# Patient Record
Sex: MALE | Race: WHITE | URBAN - METROPOLITAN AREA
[De-identification: names, ages, dates, MRNs, and addresses within clinical notes are randomized per-mention and may not be internally consistent; named-entity substitution may affect disease eponyms.]

---

## 2017-01-09 ENCOUNTER — OFFICE VISIT (OUTPATIENT)
Dept: BEHAVIORAL/MENTAL HEALTH CLINIC | Age: 47
End: 2017-01-09

## 2017-01-09 VITALS
BODY MASS INDEX: 27.94 KG/M2 | WEIGHT: 178 LBS | SYSTOLIC BLOOD PRESSURE: 126 MMHG | HEART RATE: 88 BPM | DIASTOLIC BLOOD PRESSURE: 84 MMHG | HEIGHT: 67 IN

## 2017-01-09 DIAGNOSIS — F42.9 OCD (OBSESSIVE COMPULSIVE DISORDER): ICD-10-CM

## 2017-01-09 DIAGNOSIS — F41.1 GENERALIZED ANXIETY DISORDER: ICD-10-CM

## 2017-01-09 DIAGNOSIS — F40.10 SOCIAL ANXIETY DISORDER: ICD-10-CM

## 2017-01-09 RX ORDER — CITALOPRAM 40 MG/1
40 TABLET, FILM COATED ORAL DAILY
Qty: 30 TAB | Refills: 2 | Status: SHIPPED | OUTPATIENT
Start: 2017-01-09 | End: 2017-03-08 | Stop reason: SDUPTHER

## 2017-01-09 RX ORDER — ZOLPIDEM TARTRATE 10 MG/1
10 TABLET ORAL
Qty: 30 TAB | Refills: 2 | Status: SHIPPED | OUTPATIENT
Start: 2017-01-09 | End: 2017-03-28 | Stop reason: SDUPTHER

## 2017-01-09 RX ORDER — CLONAZEPAM 0.5 MG/1
0.5 TABLET ORAL 3 TIMES DAILY
Qty: 90 TAB | Refills: 2 | Status: SHIPPED | OUTPATIENT
Start: 2017-01-09 | End: 2017-03-28 | Stop reason: SDUPTHER

## 2017-01-09 RX ORDER — CITALOPRAM 20 MG/1
TABLET, FILM COATED ORAL
Qty: 30 TAB | Refills: 2 | Status: SHIPPED | OUTPATIENT
Start: 2017-01-09 | End: 2017-03-28 | Stop reason: SDUPTHER

## 2017-01-09 NOTE — PROGRESS NOTES
Psychiatric Progress Note    Date: 1/9/2017  Account Number:  [id number]  Name: Zafar Frausto    Length of psychotherapy session: 20 minutes     Total Patient Care Time Spent: 30 minutes: (Coordinated care:  counseling time with patient, individual psychotherapy with patient; discussions with family members and chart review). CC: Anxiety  SUBJECTIVE:   Zafar Frausto  is a 40 y.o.  male  patient presents for a therapy/psychopharmacological management appointment. He presents today reporting that he enjoyed the holidays with his wife. He is enjoying his new job working at the [employer].         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, but (+)hearing aid and some dizziness  Very minimal alcohol intake since he     Appetite: good  Sleep: stable    Response to Treatment: improved  Side Effects:       Supportive/Cognitive/Reality-Oriented psychotherapy provided in regards to psychosocial stressors:   Current problems   Housing issues   Occupational issues- looking for work   Academic issues   Legal issues   Medical issues   Interpersonal conflicts-   Psychoeducation provided  Treatment plan reviewed with patient-including diagnosis and medications  Worked on issues of denial & effects of substance dependency/use    Visit Vitals    /84 (BP 1 Location: Left arm)    Pulse 88    Ht 5' 7\" (1.702 m)    Wt 80.7 kg (178 lb)    BMI 27.88 kg/m2       OBJECTIVE:                 Mental Status exam: WNL except for      Sensorium  oriented to time, place and person   Relations cooperative    Eye Contact    appropriate   Appearance:  age appropriate and casually dressed   Motor Behavior:  within normal limits   Speech:  normal pitch and normal volume   Thought Process: within normal limits   Thought Content not internally preoccupied    Suicidal ideations none   Homicidal ideations none   Mood:    anxious   Affect:    anxious   Memory recent  adequate   Memory remote:  adequate Concentration:  adequate   Abstraction:  abstract   Insight:  good   Reliability good   Judgment:  good     . vitals  No Known Allergies     Current Outpatient Prescriptions   Medication Sig Dispense Refill    citalopram (CELEXA) 20 mg tablet TAKE 1 TABLET BY MOUTH DAILY IN ADDITION TO 40MG TABLET FOR A TOTAL OF 60MG DAILY 30 Tab 0    citalopram (CELEXA) 20 mg tablet TAKE 1 TABLET BY MOUTH DAILY IN ADDITION TO 40MG TABLET FOR A TOTAL OF 60MG DAILY 30 Tab 1    citalopram (CELEXA) 40 mg tablet Take 1 Tab by mouth daily. In addition to 20mg for a total of 60mg daily. 30 Tab 2    clonazePAM (KLONOPIN) 0.5 mg tablet Take 1 Tab by mouth three (3) times daily. 90 Tab 2    zolpidem (AMBIEN) 10 mg tablet Take 1 Tab by mouth nightly. Max Daily Amount: 10 mg. 30 Tab 2    citalopram (CELEXA) 20 mg tablet TAKE 1 TABLET BY MOUTH DAILY IN ADDITION TO 40MG TABLET FOR A TOTAL OF 60MG DAILY 30 Tab 2    hyoscyamine SR (LEVBID) 0.375 mg SR tablet TAKE ONE TABLET BY MOUTH EVERY 12 HOURS AS NEEDED FOR CRAMPING AND DIARRHEA  6    LOPERAMIDE HCL (IMODIUM PO) Take  by mouth as needed.  psyllium (METAMUCIL) 0.52 gram capsule Take 1 Cap by mouth daily.  Cholecalciferol, Vitamin D3, 3,000 unit tab Take 1 Tab by mouth daily.  citalopram (CELEXA) 20 mg tablet Take 1 Tab by mouth daily. In addition to 40mg tablet for a total of 60mg  Indications: MAJOR DEPRESSIVE DISORDER 30 Tab 2    meclizine (ANTIVERT) 25 mg tablet Take 25 mg by mouth daily. Medication Changes/Adjustments: There are no discontinued medications. ASSESSMENT:  Enedelia Angelo  is a 40 y.o.  male patient presents for ongoing management of his anxiety symptoms, much improved on current medication regimen.      The following regarding medications was addressed during rounds:   -The risks and benefits of the proposed medication   -The potential medication side effects dry mouth, weight gain, GI upset, headache  -Patient given opportunity to ask questions    Diagnoses:   Axis I: Generalized Anxiety disorder; social anxiety disorder; major depressive disorder- in remission  Axis II: Deferred  Axis III: none  Axis IV: none  Axis V:61-70 mild symptoms    RECOMMENDATIONS/PLAN:   1. Medications:   Continue current regimen: Klonopin and Celexa  Continue Celexa  60mg daily- anxiety  Klonopin 0.5mg three times daily. Warned about possible sedation while driving, avoid driving if tired. Continue Ambien 10mg at bedtime   No orders of the defined types were placed in this encounter. 2.  Follow-up Disposition: Not on File     3. Continue to encourage abstinence from alcohol    4.  Provided supportive psychotherapy

## 2017-01-09 NOTE — MR AVS SNAPSHOT
Visit Information Date & Time Provider Department Dept. Phone Encounter #  
 1/9/2017  2:00 PM Sylvain Paulino MD [id number] [facility] [phone] [id number] Upcoming Health Maintenance Date Due DTaP/Tdap/Td series (1 - Tdap) 10/9/1992 INFLUENZA AGE 9 TO ADULT 8/1/2016 Allergies as of 1/9/2017  Review Complete On: 1/9/2017 By: Poornima Magaña No Known Allergies Current Immunizations  Never Reviewed No immunizations on file. Not reviewed this visit You Were Diagnosed With   
  
 Codes Comments Generalized anxiety disorder     ICD-10-CM: F41.1 ICD-9-CM: 300.02   
 OCD (obsessive compulsive disorder)     ICD-10-CM: F42.9 ICD-9-CM: 300.3 Social anxiety disorder     ICD-10-CM: F40.10 ICD-9-CM: 300.23 Vitals BP Pulse Height(growth percentile) Weight(growth percentile) BMI Smoking Status 126/84 (BP 1 Location: Left arm) 88 5' 7\" (1.702 m) 178 lb (80.7 kg) 27.88 kg/m2 Never Smoker Vitals History BMI and BSA Data Body Mass Index Body Surface Area  
 27.88 kg/m 2 1.95 m 2 Preferred Pharmacy Pharmacy [facility] Phone [facility] [address] [place] [address] [phone] Your Updated Medication List  
  
   
This list is accurate as of: 1/9/17  2:15 PM.  Always use your most recent med list.  
  
  
  
  
 Cholecalciferol (Vitamin D3) 3,000 unit Tab Take 1 Tab by mouth daily. * citalopram 20 mg tablet Commonly known as:  Tempie Tino Take 1 Tab by mouth daily. In addition to 40mg tablet for a total of 60mg  Indications: MAJOR DEPRESSIVE DISORDER  
  
 * citalopram 20 mg tablet Commonly known as:  CELEXA  
TAKE 1 TABLET BY MOUTH DAILY IN ADDITION TO 40MG TABLET FOR A TOTAL OF 60MG DAILY * citalopram 20 mg tablet Commonly known as:  CELEXA  
TAKE 1 TABLET BY MOUTH DAILY IN ADDITION TO 40MG TABLET FOR A TOTAL OF 60MG DAILY * citalopram 20 mg tablet Commonly known as:  CELEXA  
TAKE 1 TABLET BY MOUTH DAILY IN ADDITION TO 40MG TABLET FOR A TOTAL OF 60MG DAILY * citalopram 40 mg tablet Commonly known as:  Morganbernie Pinaau Take 1 Tab by mouth daily. In addition to 20mg for a total of 60mg daily. clonazePAM 0.5 mg tablet Commonly known as:  Jannet Salgado Take 1 Tab by mouth three (3) times daily. hyoscyamine SR 0.375 mg SR tablet Commonly known as:  LEVBID  
TAKE ONE TABLET BY MOUTH EVERY 12 HOURS AS NEEDED FOR CRAMPING AND DIARRHEA IMODIUM PO Take  by mouth as needed. meclizine 25 mg tablet Commonly known as:  ANTIVERT Take 25 mg by mouth daily. METAMUCIL 0.52 gram capsule Generic drug:  psyllium Take 1 Cap by mouth daily. zolpidem 10 mg tablet Commonly known as:  AMBIEN Take 1 Tab by mouth nightly. Max Daily Amount: 10 mg.  
  
 * Notice: This list has 5 medication(s) that are the same as other medications prescribed for you. Read the directions carefully, and ask your doctor or other care provider to review them with you. Prescriptions Printed Refills  
 clonazePAM (KLONOPIN) 0.5 mg tablet 2 Sig: Take 1 Tab by mouth three (3) times daily. Class: Print Route: Oral  
 zolpidem (AMBIEN) 10 mg tablet 2 Sig: Take 1 Tab by mouth nightly. Max Daily Amount: 10 mg.  
 Class: Print Route: Oral  
  
Prescriptions Sent to Pharmacy Refills  
 citalopram (CELEXA) 20 mg tablet 2 Sig: TAKE 1 TABLET BY MOUTH DAILY IN ADDITION TO 40MG TABLET FOR A TOTAL OF 60MG DAILY Class: Normal  
 Pharmacy: [facility] [address] [facility] Ph #: [phone]  
 citalopram (CELEXA) 40 mg tablet 2 Sig: Take 1 Tab by mouth daily. In addition to 20mg for a total of 60mg daily. Class: Normal  
 Pharmacy: [facility] [address] [place] [address], [address] [facility] Ph #: [phone] Route: Oral  
  
Introducing hospitals & [facility] SERVICES! Ghada Sanders introduces [employer] patient portal. Now you can access parts of your medical record, email your doctor's office, and request medication refills online. 1. In your internet browser, go to [email or web address] 2. Click on the First Time User? Click Here link in the Sign In box. You will see the New Member Sign Up page. 3. Enter your [employer] Access Code exactly as it appears below. You will not need to use this code after youve completed the sign-up process. If you do not sign up before the expiration date, you must request a new code. · [employer] Access Code: [facility] Expires: 4/9/2017  2:15 PM 
 
4. Enter the last four digits of your Social Security Number (xxxx) and Date of Birth (mm/dd/yyyy) as indicated and click Submit. You will be taken to the next sign-up page. 5. Create a [employer]jeek ID. This will be your Mojeek login ID and cannot be changed, so think of one that is secure and easy to remember. 6. Create a [employer] password. You can change your password at any time. 7. Enter your Password Reset Question and Answer. This can be used at a later time if you forget your password. 8. Enter your e-mail address. You will receive e-mail notification when new information is available in [address] 9. Click Sign Up. You can now view and download portions of your medical record. 10. Click the Download Summary menu link to download a portable copy of your medical information. If you have questions, please visit the Frequently Asked Questions section of the [employer] website. Remember, [employer]ek is NOT to be used for urgent needs. For medical emergencies, dial 911. Now available from your iPhone and Android! Please provide this summary of care documentation to your next provider. Your primary care clinician is listed as Lynne Gu If you have any questions after today's visit, please call [phone].

## 2017-03-28 ENCOUNTER — OFFICE VISIT (OUTPATIENT)
Dept: BEHAVIORAL/MENTAL HEALTH CLINIC | Age: 47
End: 2017-03-28

## 2017-03-28 VITALS
BODY MASS INDEX: 28.41 KG/M2 | HEART RATE: 103 BPM | HEIGHT: 67 IN | SYSTOLIC BLOOD PRESSURE: 124 MMHG | DIASTOLIC BLOOD PRESSURE: 79 MMHG | WEIGHT: 181 LBS

## 2017-03-28 DIAGNOSIS — F42.9 OCD (OBSESSIVE COMPULSIVE DISORDER): ICD-10-CM

## 2017-03-28 DIAGNOSIS — F41.1 GENERALIZED ANXIETY DISORDER: Primary | ICD-10-CM

## 2017-03-28 DIAGNOSIS — F33.40 MAJOR DEPRESSIVE DISORDER, RECURRENT, IN REMISSION (HCC): ICD-10-CM

## 2017-03-28 DIAGNOSIS — F40.10 SOCIAL ANXIETY DISORDER: ICD-10-CM

## 2017-03-28 DIAGNOSIS — F33.40 RECURRENT MAJOR DEPRESSIVE DISORDER, IN REMISSION (HCC): ICD-10-CM

## 2017-03-28 RX ORDER — CITALOPRAM 20 MG/1
20 TABLET, FILM COATED ORAL DAILY
Qty: 30 TAB | Refills: 3 | Status: SHIPPED | OUTPATIENT
Start: 2017-03-28 | End: 2017-07-10 | Stop reason: SDUPTHER

## 2017-03-28 RX ORDER — CLONAZEPAM 0.5 MG/1
TABLET ORAL
Qty: 90 TAB | Refills: 3 | Status: SHIPPED | OUTPATIENT
Start: 2017-03-28 | End: 2017-07-10 | Stop reason: SDUPTHER

## 2017-03-28 RX ORDER — AMITRIPTYLINE HYDROCHLORIDE 10 MG/1
TABLET, FILM COATED ORAL
COMMUNITY
Start: 2017-03-08

## 2017-03-28 RX ORDER — CITALOPRAM 40 MG/1
TABLET, FILM COATED ORAL
Qty: 30 TAB | Refills: 3 | Status: SHIPPED | OUTPATIENT
Start: 2017-03-28 | End: 2017-07-10 | Stop reason: SDUPTHER

## 2017-03-28 RX ORDER — ZOLPIDEM TARTRATE 10 MG/1
10 TABLET ORAL
Qty: 30 TAB | Refills: 3 | Status: SHIPPED | OUTPATIENT
Start: 2017-03-28 | End: 2017-07-10 | Stop reason: SDUPTHER

## 2017-03-28 NOTE — PROGRESS NOTES
Psychiatric Progress Note    Date: 3/28/2017  Account Number:  [id number]  Name: Heena Farias    Length of psychotherapy session: 20 minutes     Total Patient Care Time Spent: 30 minutes: (Coordinated care:  counseling time with patient, individual psychotherapy with patient; discussions with family members and chart review). CC: Anxiety  SUBJECTIVE:   Heena Farias  is a 40 y.o.  male  patient presents for a therapy/psychopharmacological management appointment. He presents today reporting that he is enjoying his new job and new marriage. No significant problems with depression, anxiety, insomnia, avolition, etc. He has been able to limit his alcohol intake, but his wife is worried. Tolerating medications well. He is wearing hearing aids which he finds very helpful.          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[employer], editing legislation   Academic issues   Legal issues   Medical issues   Interpersonal conflicts- continue to adjust to  life  Psychoeducation provided  Treatment plan reviewed with patient-including diagnosis and medications  Worked on issues of denial & effects of substance dependency/use    Visit Vitals    /79    Pulse (!) 103    Ht 5' 7\" (1.702 m)    Wt 82.1 kg (181 lb)    BMI 28.35 kg/m2       OBJECTIVE:                 Mental Status exam: WNL except for      Sensorium  oriented to time, place and person   Relations cooperative    Eye Contact    appropriate   Appearance:  age appropriate and casually dressed   Motor Behavior:  within normal limits   Speech:  normal pitch and normal volume   Thought Process: within normal limits   Thought Content not internally preoccupied    Suicidal ideations none   Homicidal ideations none   Mood: anxious   Affect:    anxious   Memory recent  adequate   Memory remote:  adequate   Concentration:  adequate   Abstraction:  abstract   Insight:  good   Reliability good   Judgment:  good     . vitals  No Known Allergies     Current Outpatient Prescriptions   Medication Sig Dispense Refill    amitriptyline (ELAVIL) 10 mg tablet       citalopram (CELEXA) 40 mg tablet TAKE ONE TABLET BY MOUTH DAILY IN ADDITION TO 20MG TABLET FOR A TOTAL OF 60MG DAILY 30 Tab 0    clonazePAM (KLONOPIN) 0.5 mg tablet Take 1 Tab by mouth three (3) times daily. 90 Tab 2    zolpidem (AMBIEN) 10 mg tablet Take 1 Tab by mouth nightly. Max Daily Amount: 10 mg. 30 Tab 2    hyoscyamine SR (LEVBID) 0.375 mg SR tablet TAKE ONE TABLET BY MOUTH EVERY 12 HOURS AS NEEDED FOR CRAMPING AND DIARRHEA  6    LOPERAMIDE HCL (IMODIUM PO) Take  by mouth as needed.  psyllium (METAMUCIL) 0.52 gram capsule Take 1 Cap by mouth daily.  Cholecalciferol, Vitamin D3, 3,000 unit tab Take 1 Tab by mouth daily.  citalopram (CELEXA) 20 mg tablet Take 1 Tab by mouth daily. In addition to 40mg tablet for a total of 60mg  Indications: MAJOR DEPRESSIVE DISORDER 30 Tab 2    meclizine (ANTIVERT) 25 mg tablet Take 25 mg by mouth daily. Medication Changes/Adjustments:   Medications Discontinued During This Encounter   Medication Reason    citalopram (CELEXA) 20 mg tablet Duplicate Order    citalopram (CELEXA) 20 mg tablet Duplicate Order    citalopram (CELEXA) 20 mg tablet Duplicate Order          ASSESSMENT:  Eamon West  is a 40 y.o.  male patient presents for ongoing management of his anxiety symptoms, much improved on current medication regimen.      The following regarding medications was addressed during rounds:   -The risks and benefits of the proposed medication   -The potential medication side effects dry mouth, weight gain, GI upset, headache  -Patient given opportunity to ask questions    Diagnoses:   Axis I: Generalized Anxiety disorder; social anxiety disorder; major depressive disorder- in remission  Axis II: Deferred  Axis III: none  Axis IV: none  Axis V:61-70 mild symptoms    RECOMMENDATIONS/PLAN:   1. Medications:   Continue current regimen: Klonopin and Celexa  Continue Celexa  60mg daily- anxiety  Klonopin 0.5mg three times daily. Warned about possible sedation while driving, avoid driving if tired. Continue Ambien 10mg at bedtime   Orders Placed This Encounter    amitriptyline (ELAVIL) 10 mg tablet      2. Follow-up Disposition: Not on File     3. Continue to encourage abstinence from alcohol    4.  Provided supportive psychotherapy

## 2017-03-28 NOTE — MR AVS SNAPSHOT
Visit Information Date & Time Provider Department Dept. Phone Encounter #  
 3/28/2017  2:00 PM Shobha Skaggs MD [address] [facility] [phone] [id number] Upcoming Health Maintenance Date Due DTaP/Tdap/Td series (1 - Tdap) 10/9/1992 INFLUENZA AGE 9 TO ADULT 8/1/2016 Allergies as of 3/28/2017  Review Complete On: 3/28/2017 By: Shobha Skaggs MD  
 No Known Allergies Current Immunizations  Never Reviewed No immunizations on file. Not reviewed this visit You Were Diagnosed With   
  
 Codes Comments Generalized anxiety disorder    -  Primary ICD-10-CM: F41.1 ICD-9-CM: 300.02 Recurrent major depressive disorder, in remission [facility])     ICD-10-CM: F33.40 ICD-9-CM: 296.35   
 OCD (obsessive compulsive disorder)     ICD-10-CM: F42.9 ICD-9-CM: 300.3 Social anxiety disorder     ICD-10-CM: F40.10 ICD-9-CM: 300.23 Major depressive disorder, recurrent, in remission ([facility]     ICD-10-CM: F33.40 ICD-9-CM: 296.35 Vitals BP Pulse Height(growth percentile) Weight(growth percentile) BMI Smoking Status 124/79 (!) 103 5' 7\" (1.702 m) 181 lb (82.1 kg) 28.35 kg/m2 Never Smoker Vitals History BMI and BSA Data Body Mass Index Body Surface Area  
 28.35 kg/m 2 1.97 m 2 Preferred Pharmacy Pharmacy Name Phone [facility] [address] [phone] Your Updated Medication List  
  
   
This list is accurate as of: 3/28/17  2:36 PM.  Always use your most recent med list.  
  
  
  
  
 amitriptyline 10 mg tablet Commonly known as:  ELAVIL Cholecalciferol (Vitamin D3) 3,000 unit Tab Take 1 Tab by mouth daily. * citalopram 40 mg tablet Commonly known as:  CELEXA  
TAKE ONE TABLET BY MOUTH DAILY IN ADDITION TO 20MG TABLET FOR A TOTAL OF 60MG DAILY * citalopram 20 mg tablet Commonly known as:  Mertha Slim Take 1 Tab by mouth daily. In addition to 40mg tablet for a total of 60mg  Indications: major depressive disorder  
  
 clonazePAM 0.5 mg tablet Commonly known as:  Ardie Jaden Take one tablet oral in the morning, half a tablet in the afternoon and one tablet  
  
 hyoscyamine SR 0.375 mg SR tablet Commonly known as:  LEVBID  
TAKE ONE TABLET BY MOUTH EVERY 12 HOURS AS NEEDED FOR CRAMPING AND DIARRHEA IMODIUM PO Take  by mouth as needed. meclizine 25 mg tablet Commonly known as:  ANTIVERT Take 25 mg by mouth daily. METAMUCIL 0.52 gram capsule Generic drug:  psyllium Take 1 Cap by mouth daily. zolpidem 10 mg tablet Commonly known as:  AMBIEN Take 1 Tab by mouth nightly. Max Daily Amount: 10 mg.  
  
 * Notice: This list has 2 medication(s) that are the same as other medications prescribed for you. Read the directions carefully, and ask your doctor or other care provider to review them with you. Prescriptions Printed Refills  
 zolpidem (AMBIEN) 10 mg tablet 3 Sig: Take 1 Tab by mouth nightly. Max Daily Amount: 10 mg.  
 Class: Print Route: Oral  
 clonazePAM (KLONOPIN) 0.5 mg tablet 3 Sig: Take one tablet oral in the morning, half a tablet in the afternoon and one tablet Class: Print Prescriptions Sent to Pharmacy Refills  
 citalopram (CELEXA) 40 mg tablet 3 Sig: TAKE ONE TABLET BY MOUTH DAILY IN ADDITION TO 20MG TABLET FOR A TOTAL OF 60MG DAILY Class: Normal  
 Pharmacy: [address] Ph #: [phone]  
 citalopram (CELEXA) 20 mg tablet 3 Sig: Take 1 Tab by mouth daily. In addition to 40mg tablet for a total of 60mg  Indications: major depressive disorder Class: Normal  
 Pharmacy: [address] Ph #: [phone] Route: Oral  
  
Introducing [facility] & HEALTH SERVICES!    
 Mimi Leiva introduces [employer] patient portal. Now you can access parts of your medical record, email your doctor's office, and request medication refills online. 1. In your internet browser, go to [email or web address] 2. Click on the First Time User? Click Here link in the Sign In box. You will see the New Member Sign Up page. 3. Enter your [employer] Access Code exactly as it appears below. You will not need to use this code after youve completed the sign-up process. If you do not sign up before the expiration date, you must request a new code. · [employer] Access Code: [facility] Expires: 4/9/2017  3:15 PM 
 
4. Enter the last four digits of your Social Security Number (xxxx) and Date of Birth (mm/dd/yyyy) as indicated and click Submit. You will be taken to the next sign-up page. 5. Create a [employer] ID. This will be your [employer] login ID and cannot be changed, so think of one that is secure and easy to remember. 6. Create a [employer] password. You can change your password at any time. 7. Enter your Password Reset Question and Answer. This can be used at a later time if you forget your password. 8. Enter your e-mail address. You will receive e-mail notification when new information is available in [address] 9. Click Sign Up. You can now view and download portions of your medical record. 10. Click the Download Summary menu link to download a portable copy of your medical information. If you have questions, please visit the Frequently Asked Questions section of the [employer] website. Remember, [employer] is NOT to be used for urgent needs. For medical emergencies, dial 911. Now available from your iPhone and Android! Please provide this summary of care documentation to your next provider. Your primary care clinician is listed as Adin Douglas If you have any questions after today's visit, please call [phone].

## 2017-07-10 ENCOUNTER — OFFICE VISIT (OUTPATIENT)
Dept: BEHAVIORAL/MENTAL HEALTH CLINIC | Age: 47
End: 2017-07-10

## 2017-07-10 VITALS
DIASTOLIC BLOOD PRESSURE: 78 MMHG | SYSTOLIC BLOOD PRESSURE: 128 MMHG | BODY MASS INDEX: 28.88 KG/M2 | WEIGHT: 184 LBS | HEIGHT: 67 IN | HEART RATE: 107 BPM

## 2017-07-10 DIAGNOSIS — F41.1 GENERALIZED ANXIETY DISORDER: ICD-10-CM

## 2017-07-10 DIAGNOSIS — F33.40 MAJOR DEPRESSIVE DISORDER, RECURRENT, IN REMISSION (HCC): ICD-10-CM

## 2017-07-10 DIAGNOSIS — F40.10 SOCIAL ANXIETY DISORDER: ICD-10-CM

## 2017-07-10 RX ORDER — ZOLPIDEM TARTRATE 10 MG/1
10 TABLET ORAL
Qty: 30 TAB | Refills: 3 | Status: SHIPPED | OUTPATIENT
Start: 2017-07-10 | End: 2017-08-16 | Stop reason: SDUPTHER

## 2017-07-10 RX ORDER — CLONAZEPAM 0.5 MG/1
TABLET ORAL
Qty: 90 TAB | Refills: 3 | Status: SHIPPED | OUTPATIENT
Start: 2017-07-10 | End: 2017-08-16 | Stop reason: SDUPTHER

## 2017-07-10 RX ORDER — HYDROCORTISONE ACETATE PRAMOXINE HCL 2.5; 1 G/100G; G/100G
CREAM TOPICAL
COMMUNITY
Start: 2017-07-06

## 2017-07-10 RX ORDER — CITALOPRAM 20 MG/1
20 TABLET, FILM COATED ORAL DAILY
Qty: 30 TAB | Refills: 3 | Status: SHIPPED | OUTPATIENT
Start: 2017-07-10 | End: 2017-08-16 | Stop reason: SDUPTHER

## 2017-07-10 RX ORDER — CITALOPRAM 40 MG/1
TABLET, FILM COATED ORAL
Qty: 30 TAB | Refills: 3 | Status: SHIPPED | OUTPATIENT
Start: 2017-07-10 | End: 2017-08-16 | Stop reason: SDUPTHER

## 2017-07-10 RX ORDER — HYDROCHLOROTHIAZIDE 12.5 MG/1
CAPSULE ORAL
COMMUNITY

## 2017-07-10 NOTE — MR AVS SNAPSHOT
Visit Information Date & Time Provider Department Dept. Phone Encounter #  
 7/10/2017  3:30 PM Rene Deleon MD [address] [phone] [id number] Upcoming Health Maintenance Date Due DTaP/Tdap/Td series (1 - Tdap) 10/9/1992 INFLUENZA AGE 9 TO ADULT 8/1/2017 Allergies as of 7/10/2017  Review Complete On: 7/10/2017 By: Jazmín Melton No Known Allergies Current Immunizations  Never Reviewed No immunizations on file. Not reviewed this visit You Were Diagnosed With   
  
 Codes Comments Generalized anxiety disorder     ICD-10-CM: F41.1 ICD-9-CM: 300.02 Social anxiety disorder     ICD-10-CM: F40.10 ICD-9-CM: 300.23 Major depressive disorder, recurrent, in remission ([facility]     ICD-10-CM: F33.40 ICD-9-CM: 296.35 Vitals BP Pulse Height(growth percentile) Weight(growth percentile) BMI Smoking Status 128/78 (!) 107 5' 7\" (1.702 m) 184 lb (83.5 kg) 28.82 kg/m2 Never Smoker Vitals History BMI and BSA Data Body Mass Index Body Surface Area  
 28.82 kg/m 2 1.99 m 2 [facility] [address] [phone] Your Updated Medication List  
  
   
This list is accurate as of: 7/10/17  3:53 PM.  Always use your most recent med list.  
  
  
  
  
 amitriptyline 10 mg tablet Commonly known as:  ELAVIL Cholecalciferol (Vitamin D3) 3,000 unit Tab Take 1 Tab by mouth daily. * citalopram 20 mg tablet Commonly known as:  Gray Deed Take 1 Tab by mouth daily. In addition to 40mg tablet for a total of 60mg  Indications: major depressive disorder * citalopram 40 mg tablet Commonly known as:  CELEXA  
TAKE ONE TABLET BY MOUTH DAILY IN ADDITION TO 20MG TABLET FOR A TOTAL OF 60MG DAILY  
  
 clonazePAM 0.5 mg tablet Commonly known as:  Yany Moctezuma  
 Take one tablet oral in the morning, half a tablet in the afternoon and one tablet  
  
 hydroCHLOROthiazide 12.5 mg capsule Commonly known as:  Don Brand Take  by mouth. hydrocortisone-pramoxine 2.5-1 % rectal cream  
Commonly known as:  ANALPRAM HC 2.5%-1%  
  
 hyoscyamine SR 0.375 mg SR tablet Commonly known as:  LEVBID  
TAKE ONE TABLET BY MOUTH EVERY 12 HOURS AS NEEDED FOR CRAMPING AND DIARRHEA IMODIUM PO Take  by mouth as needed. meclizine 25 mg tablet Commonly known as:  ANTIVERT Take 25 mg by mouth daily. METAMUCIL 0.52 gram capsule Generic drug:  psyllium Take 1 Cap by mouth daily. zolpidem 10 mg tablet Commonly known as:  AMBIEN Take 1 Tab by mouth nightly. Max Daily Amount: 10 mg.  
  
 * Notice: This list has 2 medication(s) that are the same as other medications prescribed for you. Read the directions carefully, and ask your doctor or other care provider to review them with you. Prescriptions Printed Refills  
 clonazePAM (KLONOPIN) 0.5 mg tablet 3 Sig: Take one tablet oral in the morning, half a tablet in the afternoon and one tablet Class: Print  
 citalopram (CELEXA) 20 mg tablet 3 Sig: Take 1 Tab by mouth daily. In addition to 40mg tablet for a total of 60mg  Indications: major depressive disorder Class: Print Route: Oral  
 citalopram (CELEXA) 40 mg tablet 3 Sig: TAKE ONE TABLET BY MOUTH DAILY IN ADDITION TO 20MG TABLET FOR A TOTAL OF 60MG DAILY Class: Print  
 zolpidem (AMBIEN) 10 mg tablet 3 Sig: Take 1 Tab by mouth nightly. Max Daily Amount: 10 mg.  
 Class: Print Route: Oral  
  
Introducing [facility]! Mariaa Zuleta introduces [employer] patient portal. Now you can access parts of your medical record, email your doctor's office, and request medication refills online. 1. In your internet browser, go to [email or web address] 2. Click on the First Time User? Click Here link in the Sign In box. You will see the New Member Sign Up page. 3. Enter your Ac[employer] Access Code exactly as it appears below. You will not need to use this code after youve completed the sign-up process. If you do not sign up before the expiration date, you must request a new code. · Ac[employer] Access Code: [email or web address] Expires: 10/8/2017  3:53 PM 
 
4. Enter the last four digits of your Social Security Number (xxxx) and Date of Birth (mm/dd/yyyy) as indicated and click Submit. You will be taken to the next sign-up page. 5. Create a Acceleforce ID. This will be your Ac[employer] login ID and cannot be changed, so think of one that is secure and easy to remember. 6. Create a Ac[employer] password. You can change your password at any time. 7. Enter your Password Reset Question and Answer. This can be used at a later time if you forget your password. 8. Enter your e-mail address. You will receive e-mail notification when new information is available in [address] 9. Click Sign Up. You can now view and download portions of your medical record. 10. Click the Download Summary menu link to download a portable copy of your medical information. If you have questions, please visit the Frequently Asked Questions section of the Ac[employer] website. Remember, [employer] is NOT to be used for urgent needs. For medical emergencies, dial 911. Now available from your iPhone and Android! Please provide this summary of care documentation to your next provider. Your primary care clinician is listed as Kathryn Brooks If you have any questions after today's visit, please call [phone].

## 2017-07-10 NOTE — PROGRESS NOTES
Psychiatric Progress Note    Date: 7/10/2017  Account Number:  [id number]  Name: Cara Mustafa    Length of psychotherapy session: 20 minutes     Total Patient Care Time Spent: 30 minutes: (Coordinated care:  counseling time with patient, individual psychotherapy with patient; discussions with family members and chart review). CC: Anxiety  SUBJECTIVE:   Cara Mustafa  is a 40 y.o.  male  patient presents for a therapy/psychopharmacological management appointment. He denies any significant problems with his mood. 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 He has several prospects. He reduced his klonopin by 0.25mg daily, but developed severe anxiety/ withdrawal sx and had to resume the full dosage. Alcohol intake reduced, but he notes increased carb eating.         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[employer], editing legislation   Academic issues   Legal issues   Medical issues   Interpersonal conflicts- continue to adjust to  life  Psychoeducation provided  Treatment plan reviewed with patient-including diagnosis and medications  Worked on issues of denial & effects of substance dependency/use    Visit Vitals    /78    Pulse (!) 107    Ht 5' 7\" (1.702 m)    Wt 83.5 kg (184 lb)    BMI 28.82 kg/m2       OBJECTIVE:                 Mental Status exam: WNL except for      Sensorium  oriented to time, place and person   Relations cooperative    Eye Contact    appropriate   Appearance:  age appropriate and casually dressed   Motor Behavior:  within normal limits   Speech:  normal pitch and normal volume   Thought Process: within normal limits   Thought Content not internally preoccupied    Suicidal ideations none   Homicidal ideations none   Mood:    anxious   Affect:    anxious   Memory recent  adequate   Memory remote:  adequate   Concentration:  adequate   Abstraction:  abstract   Insight:  good   Reliability good   Judgment:  good     . vitals  No Known Allergies     Current Outpatient Prescriptions   Medication Sig Dispense Refill    hydrocortisone-pramoxine (ANALPRAM HC 2.5%-1%) 2.5-1 % rectal cream       hydroCHLOROthiazide (MICROZIDE) 12.5 mg capsule Take  by mouth.  clonazePAM (KLONOPIN) 0.5 mg tablet Take one tablet oral in the morning, half a tablet in the afternoon and one tablet 90 Tab 3    citalopram (CELEXA) 20 mg tablet Take 1 Tab by mouth daily. In addition to 40mg tablet for a total of 60mg  Indications: major depressive disorder 30 Tab 3    citalopram (CELEXA) 40 mg tablet TAKE ONE TABLET BY MOUTH DAILY IN ADDITION TO 20MG TABLET FOR A TOTAL OF 60MG DAILY 30 Tab 3    zolpidem (AMBIEN) 10 mg tablet Take 1 Tab by mouth nightly. Max Daily Amount: 10 mg. 30 Tab 3    amitriptyline (ELAVIL) 10 mg tablet       hyoscyamine SR (LEVBID) 0.375 mg SR tablet TAKE ONE TABLET BY MOUTH EVERY 12 HOURS AS NEEDED FOR CRAMPING AND DIARRHEA  6    LOPERAMIDE HCL (IMODIUM PO) Take  by mouth as needed.  psyllium (METAMUCIL) 0.52 gram capsule Take 1 Cap by mouth daily.  Cholecalciferol, Vitamin D3, 3,000 unit tab Take 1 Tab by mouth daily.  meclizine (ANTIVERT) 25 mg tablet Take 25 mg by mouth daily. Medication Changes/Adjustments:   Medications Discontinued During This Encounter   Medication Reason    clonazePAM (KLONOPIN) 0.5 mg tablet Reorder    citalopram (CELEXA) 20 mg tablet Reorder    citalopram (CELEXA) 40 mg tablet Reorder    zolpidem (AMBIEN) 10 mg tablet Reorder          ASSESSMENT:  Verner Rom  is a 40 y.o.  male patient presents for ongoing management of his anxiety symptoms, much improved on current medication regimen.      The following regarding medications was addressed during rounds:   -The risks and benefits of the proposed medication   -The potential medication side effects dry mouth, weight gain, GI upset, headache  -Patient given opportunity to ask questions    Diagnoses:   Axis I: Generalized Anxiety disorder; social anxiety disorder; major depressive disorder- in remission  Axis II: Deferred  Axis III: none  Axis IV: none  Axis V:61-70 mild symptoms    RECOMMENDATIONS/PLAN:   1. Medications:   Continue current regimen: Klonopin and Celexa  Continue Celexa  60mg daily- anxiety  Klonopin 0.5mg three times daily. Warned about possible sedation while driving, avoid driving if tired. Plan to taper- reduce hs dose to 0.75mg (pill cutter advised)  Continue Ambien 10mg at bedtime   Orders Placed This Encounter    hydrocortisone-pramoxine (ANALPRAM HC 2.5%-1%) 2.5-1 % rectal cream    hydroCHLOROthiazide (MICROZIDE) 12.5 mg capsule    clonazePAM (KLONOPIN) 0.5 mg tablet    citalopram (CELEXA) 20 mg tablet    citalopram (CELEXA) 40 mg tablet    zolpidem (AMBIEN) 10 mg tablet      2. Follow-up Disposition: Not on File     3. Continue to encourage abstinence from alcohol    4.  Provided supportive psychotherapy

## 2017-08-16 ENCOUNTER — OFFICE VISIT (OUTPATIENT)
Dept: BEHAVIORAL/MENTAL HEALTH CLINIC | Age: 47
End: 2017-08-16

## 2017-08-16 VITALS
BODY MASS INDEX: 28.25 KG/M2 | HEART RATE: 91 BPM | HEIGHT: 67 IN | WEIGHT: 180 LBS | SYSTOLIC BLOOD PRESSURE: 133 MMHG | DIASTOLIC BLOOD PRESSURE: 84 MMHG

## 2017-08-16 DIAGNOSIS — F32.5 MAJOR DEPRESSIVE DISORDER WITH SINGLE EPISODE, IN FULL REMISSION (HCC): Primary | ICD-10-CM

## 2017-08-16 DIAGNOSIS — F33.40 MAJOR DEPRESSIVE DISORDER, RECURRENT, IN REMISSION (HCC): ICD-10-CM

## 2017-08-16 DIAGNOSIS — F41.1 GENERALIZED ANXIETY DISORDER: ICD-10-CM

## 2017-08-16 DIAGNOSIS — F40.10 SOCIAL ANXIETY DISORDER: ICD-10-CM

## 2017-08-16 RX ORDER — CLONAZEPAM 0.5 MG/1
TABLET ORAL
Qty: 60 TAB | Refills: 3 | Status: SHIPPED | OUTPATIENT
Start: 2017-08-16 | End: 2017-12-13 | Stop reason: SDUPTHER

## 2017-08-16 RX ORDER — CITALOPRAM 20 MG/1
20 TABLET, FILM COATED ORAL DAILY
Qty: 30 TAB | Refills: 3 | Status: SHIPPED | OUTPATIENT
Start: 2017-08-16 | End: 2017-12-13 | Stop reason: SDUPTHER

## 2017-08-16 RX ORDER — ZOLPIDEM TARTRATE 10 MG/1
10 TABLET ORAL
Qty: 30 TAB | Refills: 3 | Status: SHIPPED | OUTPATIENT
Start: 2017-08-16 | End: 2017-12-13 | Stop reason: SDUPTHER

## 2017-08-16 RX ORDER — CITALOPRAM 40 MG/1
TABLET, FILM COATED ORAL
Qty: 30 TAB | Refills: 3 | Status: SHIPPED | OUTPATIENT
Start: 2017-08-16 | End: 2017-12-13 | Stop reason: SDUPTHER

## 2017-08-16 RX ORDER — HYOSCYAMINE SULFATE 0.12 MG/1
TABLET SUBLINGUAL
COMMUNITY
Start: 2017-07-25

## 2017-08-16 NOTE — PROGRESS NOTES
Psychiatric Progress Note    Date: 8/16/2017  Account Number:  [id number]  Name: Zeferino Mary    Length of psychotherapy session: 20 minutes     Total Patient Care Time Spent: 30 minutes: (Coordinated care:  counseling time with patient, individual psychotherapy with patient; discussions with family members and chart review). CC: Anxiety  SUBJECTIVE:   Zeferino Mary  is a 40 y.o.  male  patient presents for a therapy/psychopharmacological management appointment. He denies any significant problems with his mood. He notes that he has been only taking the Klonopin twice daily but he does not notice any difference with his anxiety level. He denies feeling depressed. He denies any problems with suicidal thoughts. No marital problems.    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   Academic issues   Legal issues   Medical issues   Interpersonal conflicts- continuing  to adjust to  life  Psychoeducation provided  Treatment plan reviewed with patient-including diagnosis and medications  Worked on issues of denial & effects of substance dependency/use    Visit Vitals    /84    Pulse 91    Ht 5' 7\" (1.702 m)    Wt 81.6 kg (180 lb)    BMI 28.19 kg/m2       OBJECTIVE:                 Mental Status exam: WNL except for      Sensorium  oriented to time, place and person   Relations cooperative    Eye Contact    appropriate   Appearance:  age appropriate and casually dressed   Motor Behavior:  within normal limits   Speech:  normal pitch and normal volume   Thought Process: within normal limits   Thought Content not internally preoccupied    Suicidal ideations none   Homicidal ideations none   Mood:    anxious   Affect: anxious   Memory recent  adequate   Memory remote:  adequate   Concentration:  adequate   Abstraction:  abstract   Insight:  good   Reliability good   Judgment:  good     . vitals  No Known Allergies     Current Outpatient Prescriptions   Medication Sig Dispense Refill    hyoscyamine SL (LEVSIN/SL) 0.125 mg SL tablet       hydrocortisone-pramoxine (ANALPRAM HC 2.5%-1%) 2.5-1 % rectal cream       hydroCHLOROthiazide (MICROZIDE) 12.5 mg capsule Take  by mouth.  clonazePAM (KLONOPIN) 0.5 mg tablet Take one tablet oral in the morning, half a tablet in the afternoon and one tablet 90 Tab 3    citalopram (CELEXA) 20 mg tablet Take 1 Tab by mouth daily. In addition to 40mg tablet for a total of 60mg  Indications: major depressive disorder 30 Tab 3    citalopram (CELEXA) 40 mg tablet TAKE ONE TABLET BY MOUTH DAILY IN ADDITION TO 20MG TABLET FOR A TOTAL OF 60MG DAILY 30 Tab 3    zolpidem (AMBIEN) 10 mg tablet Take 1 Tab by mouth nightly. Max Daily Amount: 10 mg. 30 Tab 3    amitriptyline (ELAVIL) 10 mg tablet       hyoscyamine SR (LEVBID) 0.375 mg SR tablet TAKE ONE TABLET BY MOUTH EVERY 12 HOURS AS NEEDED FOR CRAMPING AND DIARRHEA  6    LOPERAMIDE HCL (IMODIUM PO) Take  by mouth as needed.  psyllium (METAMUCIL) 0.52 gram capsule Take 1 Cap by mouth daily.  Cholecalciferol, Vitamin D3, 3,000 unit tab Take 1 Tab by mouth daily.  meclizine (ANTIVERT) 25 mg tablet Take 25 mg by mouth daily. Medication Changes/Adjustments: There are no discontinued medications. ASSESSMENT:  Radha Hansen  is a 40 y.o.  male patient presents for ongoing management of his anxiety symptoms, much improved on current medication regimen.      The following regarding medications was addressed during rounds:   -The risks and benefits of the proposed medication   -The potential medication side effects dry mouth, weight gain, GI upset, headache  -Patient given opportunity to ask questions    Diagnoses:   Axis I: Generalized Anxiety disorder; social anxiety disorder; major depressive disorder- in remission  Axis II: Deferred  Axis III: none  Axis IV: none  Axis V:61-70 mild symptoms    RECOMMENDATIONS/PLAN:   1. Medications:   Continue current regimen: Klonopin and Celexa  Continue Celexa  60mg daily- anxiety  Klonopin 0.5mg two times daily. Warned about possible sedation while driving, avoid driving if tired. Plan to taper  Continue Ambien 10mg at bedtime   Orders Placed This Encounter    hyoscyamine SL (LEVSIN/SL) 0.125 mg SL tablet      2. Follow-up Disposition: Not on File     3. Continue to encourage abstinence from alcohol    4.  Provided supportive psychotherapy

## 2017-08-16 NOTE — MR AVS SNAPSHOT
Visit Information Date & Time Provider Department Dept. Phone Encounter #  
 8/16/2017  2:30 PM MD [facility] [address] [phone] [id number] Follow-up Instructions Return in about 3 months (around 11/16/2017). Upcoming Health Maintenance Date Due DTaP/Tdap/Td series (1 - Tdap) 10/9/1992 INFLUENZA AGE 9 TO ADULT 8/1/2017 Allergies as of 8/16/2017  Review Complete On: 8/16/2017 By: Brent Hearing No Known Allergies Current Immunizations  Never Reviewed No immunizations on file. Not reviewed this visit You Were Diagnosed With   
  
 Codes Comments Major depressive disorder with single episode, in full remission ([facility]    -  Primary ICD-10-CM: F32.5 ICD-9-CM: 296.26 Generalized anxiety disorder     ICD-10-CM: F41.1 ICD-9-CM: 300.02 Social anxiety disorder     ICD-10-CM: F40.10 ICD-9-CM: 300.23 Major depressive disorder, recurrent, in remission ([facility]     ICD-10-CM: F33.40 ICD-9-CM: 296.35 Vitals BP Pulse Height(growth percentile) Weight(growth percentile) BMI Smoking Status 133/84 91 5' 7\" (1.702 m) 180 lb (81.6 kg) 28.19 kg/m2 Never Smoker BMI and BSA Data Body Mass Index Body Surface Area  
 28.19 kg/m 2 1.96 m 2 Preferred Pharmacy Pharmacy Name Phone Keli Paget [address] [facility] [phone] Your Updated Medication List  
  
   
This list is accurate as of: 8/16/17  2:44 PM.  Always use your most recent med list.  
  
  
  
  
 amitriptyline 10 mg tablet Commonly known as:  ELAVIL Cholecalciferol (Vitamin D3) 3,000 unit Tab Take 1 Tab by mouth daily. * citalopram 20 mg tablet Commonly known as:  Donice Apt Take 1 Tab by mouth daily. In addition to 40mg tablet for a total of 60mg  Indications: major depressive disorder * citalopram 40 mg tablet Commonly known as:  Donice Apt TAKE ONE TABLET BY MOUTH DAILY IN ADDITION TO 20MG TABLET FOR A TOTAL OF 60MG DAILY  
  
 clonazePAM 0.5 mg tablet Commonly known as:  Cyrilla Mayans Take one tablet oral twice daily  
  
 hydroCHLOROthiazide 12.5 mg capsule Commonly known as:  Christia Members Take  by mouth. hydrocortisone-pramoxine 2.5-1 % rectal cream  
Commonly known as:  ANALPRAM HC 2.5%-1% * hyoscyamine SR 0.375 mg SR tablet Commonly known as:  LEVBID  
TAKE ONE TABLET BY MOUTH EVERY 12 HOURS AS NEEDED FOR CRAMPING AND DIARRHEA * hyoscyamine SL 0.125 mg SL tablet Commonly known as:  LEVSIN/SL  
  
 IMODIUM PO Take  by mouth as needed. meclizine 25 mg tablet Commonly known as:  ANTIVERT Take 25 mg by mouth daily. METAMUCIL 0.52 gram capsule Generic drug:  psyllium Take 1 Cap by mouth daily. zolpidem 10 mg tablet Commonly known as:  AMBIEN Take 1 Tab by mouth nightly. Max Daily Amount: 10 mg.  
  
 * Notice: This list has 4 medication(s) that are the same as other medications prescribed for you. Read the directions carefully, and ask your doctor or other care provider to review them with you. Prescriptions Printed Refills  
 clonazePAM (KLONOPIN) 0.5 mg tablet 3 Sig: Take one tablet oral twice daily Class: Print  
 zolpidem (AMBIEN) 10 mg tablet 3 Sig: Take 1 Tab by mouth nightly. Max Daily Amount: 10 mg.  
 Class: Print Route: Oral  
  
Prescriptions Sent to Pharmacy Refills  
 citalopram (CELEXA) 20 mg tablet 3 Sig: Take 1 Tab by mouth daily. In addition to 40mg tablet for a total of 60mg  Indications: major depressive disorder Class: Normal  
 Pharmacy: Tanner Albarran [address] [place] [address], [address] Ph #: [phone] Route: Oral  
 citalopram (CELEXA) 40 mg tablet 3 Sig: TAKE ONE TABLET BY MOUTH DAILY IN ADDITION TO 20MG TABLET FOR A TOTAL OF 60MG DAILY  Class: Normal  
 Pharmacy: Oniel Woodward [address]  #: [phone] Follow-up Instructions Return in about 3 months (around 11/16/2017). Introducing [facility]! Antonio Davidson introduces [employer] patient portal. Now you can access parts of your medical record, email your doctor's office, and request medication refills online. 1. In your internet browser, go to [email or web address] 2. Click on the First Time User? Click Here link in the Sign In box. You will see the New Member Sign Up page. 3. Enter your Wayna Access Code exactly as it appears below. You will not need to use this code after youve completed the sign-up process. If you do not sign up before the expiration date, you must request a new code. · Wayna Access Code: [email or web address] Expires: 10/8/2017  3:53 PM 
 
4. Enter the last four digits of your Social Security Number (xxxx) and Date of Birth (mm/dd/yyyy) as indicated and click Submit. You will be taken to the next sign-up page. 5. Create a Wayna ID. This will be your Wayna login ID and cannot be changed, so think of one that is secure and easy to remember. 6. Create a Wayna password. You can change your password at any time. 7. Enter your Password Reset Question and Answer. This can be used at a later time if you forget your password. 8. Enter your e-mail address. You will receive e-mail notification when new information is available in [address] 9. Click Sign Up. You can now view and download portions of your medical record. 10. Click the Download Summary menu link to download a portable copy of your medical information. If you have questions, please visit the Frequently Asked Questions section of the Wayna website. Remember, Wayna is NOT to be used for urgent needs. For medical emergencies, dial 911. Now available from your iPhone and Android! Please provide this summary of care documentation to your next provider. Your primary care clinician is listed as Terri Sanchezus If you have any questions after today's visit, please call [phone].

## 2017-12-13 ENCOUNTER — OFFICE VISIT (OUTPATIENT)
Dept: BEHAVIORAL/MENTAL HEALTH CLINIC | Age: 47
End: 2017-12-13

## 2017-12-13 VITALS
HEART RATE: 97 BPM | HEIGHT: 67 IN | BODY MASS INDEX: 29.51 KG/M2 | WEIGHT: 188 LBS | DIASTOLIC BLOOD PRESSURE: 87 MMHG | SYSTOLIC BLOOD PRESSURE: 125 MMHG

## 2017-12-13 DIAGNOSIS — F40.10 SOCIAL ANXIETY DISORDER: ICD-10-CM

## 2017-12-13 DIAGNOSIS — F33.40 MAJOR DEPRESSIVE DISORDER, RECURRENT, IN REMISSION (HCC): ICD-10-CM

## 2017-12-13 DIAGNOSIS — F41.1 GENERALIZED ANXIETY DISORDER: ICD-10-CM

## 2017-12-13 RX ORDER — CITALOPRAM 20 MG/1
20 TABLET, FILM COATED ORAL DAILY
Qty: 30 TAB | Refills: 3 | Status: SHIPPED | OUTPATIENT
Start: 2017-12-13 | End: 2018-03-13 | Stop reason: SDUPTHER

## 2017-12-13 RX ORDER — CITALOPRAM 40 MG/1
TABLET, FILM COATED ORAL
Qty: 30 TAB | Refills: 3 | Status: SHIPPED | OUTPATIENT
Start: 2017-12-13 | End: 2018-03-13 | Stop reason: SDUPTHER

## 2017-12-13 RX ORDER — ZOLPIDEM TARTRATE 10 MG/1
10 TABLET ORAL
Qty: 30 TAB | Refills: 3 | Status: SHIPPED | OUTPATIENT
Start: 2017-12-13 | End: 2018-03-13 | Stop reason: SDUPTHER

## 2017-12-13 RX ORDER — CLONAZEPAM 0.5 MG/1
0.5 TABLET ORAL 3 TIMES DAILY
Qty: 90 TAB | Refills: 3 | Status: SHIPPED | OUTPATIENT
Start: 2017-12-13 | End: 2018-03-13 | Stop reason: SDUPTHER

## 2017-12-13 NOTE — MR AVS SNAPSHOT
Visit Information Date & Time Provider Department Dept. Phone Encounter #  
 12/13/2017  3:00 PM Gabrielle Cardona  [facility] [phone] [id number] Upcoming Health Maintenance Date Due DTaP/Tdap/Td series (1 - Tdap) 10/9/1992 Influenza Age 5 to Adult 8/1/2017 Allergies as of 12/13/2017  Review Complete On: 12/13/2017 By: Laurel Cantor No Known Allergies Current Immunizations  Never Reviewed No immunizations on file. Not reviewed this visit You Were Diagnosed With   
  
 Codes Comments Generalized anxiety disorder     ICD-10-CM: F41.1 ICD-9-CM: 300.02 Major depressive disorder, recurrent, in remission ([facility]     ICD-10-CM: F33.40 ICD-9-CM: 296.35 Social anxiety disorder     ICD-10-CM: F40.10 ICD-9-CM: 300.23 Vitals BP Pulse Height(growth percentile) Weight(growth percentile) BMI Smoking Status 125/87 97 5' 7\" (1.702 m) 188 lb (85.3 kg) 29.44 kg/m2 Never Smoker BMI and BSA Data Body Mass Index Body Surface Area  
 29.44 kg/m 2 2.01 m 2 Preferred Pharmacy Pharmacy Name Phone [facility] [place] [id number] [facility] [place] [phone] Your Updated Medication List  
  
   
This list is accurate as of: 12/13/17  3:25 PM.  Always use your most recent med list.  
  
  
  
  
 amitriptyline 10 mg tablet Commonly known as:  ELAVIL Cholecalciferol (Vitamin D3) 3,000 unit Tab Take 1 Tab by mouth daily. * citalopram 20 mg tablet Commonly known as:  Bev Moon Take 1 Tab by mouth daily. In addition to 40mg tablet for a total of 60mg  Indications: major depressive disorder * citalopram 40 mg tablet Commonly known as:  CELEXA  
TAKE ONE TABLET BY MOUTH DAILY IN ADDITION TO 20MG TABLET FOR A TOTAL OF 60MG DAILY  
  
 clonazePAM 0.5 mg tablet Commonly known as:  Noelle Kahn Take 1 Tab by mouth three (3) times daily.  Max Daily Amount: 1.5 mg.  
  
 hydroCHLOROthiazide 12.5 mg capsule Commonly known as:  Ferdie Cea Take  by mouth. hydrocortisone-pramoxine 2.5-1 % rectal cream  
Commonly known as:  ANALPRAM HC 2.5%-1% * hyoscyamine SR 0.375 mg SR tablet Commonly known as:  LEVBID  
TAKE ONE TABLET BY MOUTH EVERY 12 HOURS AS NEEDED FOR CRAMPING AND DIARRHEA * hyoscyamine SL 0.125 mg SL tablet Commonly known as:  LEVSIN/SL  
  
 IMODIUM PO Take  by mouth as needed. meclizine 25 mg tablet Commonly known as:  ANTIVERT Take 25 mg by mouth daily. METAMUCIL 0.52 gram capsule Generic drug:  psyllium Take 1 Cap by mouth daily. zolpidem 10 mg tablet Commonly known as:  AMBIEN Take 1 Tab by mouth nightly. Max Daily Amount: 10 mg.  
  
 * Notice: This list has 4 medication(s) that are the same as other medications prescribed for you. Read the directions carefully, and ask your doctor or other care provider to review them with you. Prescriptions Printed Refills  
 citalopram (CELEXA) 20 mg tablet 3 Sig: Take 1 Tab by mouth daily. In addition to 40mg tablet for a total of 60mg  Indications: major depressive disorder Class: Print Route: Oral  
 citalopram (CELEXA) 40 mg tablet 3 Sig: TAKE ONE TABLET BY MOUTH DAILY IN ADDITION TO 20MG TABLET FOR A TOTAL OF 60MG DAILY Class: Print  
 zolpidem (AMBIEN) 10 mg tablet 3 Sig: Take 1 Tab by mouth nightly. Max Daily Amount: 10 mg.  
 Class: Print Route: Oral  
 clonazePAM (KLONOPIN) 0.5 mg tablet 3 Sig: Take 1 Tab by mouth three (3) times daily. Max Daily Amount: 1.5 mg.  
 Class: Print Route: Oral  
  
Introducing [facility]! Karina Woody introduces [employer] patient portal. Now you can access parts of your medical record, email your doctor's office, and request medication refills online. 1. In your internet browser, go to [email or web address] 2. Click on the First Time User? Click Here link in the Sign In box. You will see the New Member Sign Up page. 3. Enter your [employer] Access Code exactly as it appears below. You will not need to use this code after youve completed the sign-up process. If you do not sign up before the expiration date, you must request a new code. · [employer] Access Code: [email or web address] Expires: 3/13/2018  3:25 PM 
 
4. Enter the last four digits of your Social Security Number (xxxx) and Date of Birth (mm/dd/yyyy) as indicated and click Submit. You will be taken to the next sign-up page. 5. Create a [employer] ID. This will be your [employer] login ID and cannot be changed, so think of one that is secure and easy to remember. 6. Create a [employer] password. You can change your password at any time. 7. Enter your Password Reset Question and Answer. This can be used at a later time if you forget your password. 8. Enter your e-mail address. You will receive e-mail notification when new information is available in [address] 9. Click Sign Up. You can now view and download portions of your medical record. 10. Click the Download Summary menu link to download a portable copy of your medical information. If you have questions, please visit the Frequently Asked Questions section of the [employer] website. Remember, [employer] is NOT to be used for urgent needs. For medical emergencies, dial 911. Now available from your iPhone and Android! Please provide this summary of care documentation to your next provider. Your primary care clinician is listed as Rohit Jasso If you have any questions after today's visit, please call [phone].

## 2017-12-13 NOTE — PROGRESS NOTES
Psychiatric Progress Note    Date: 12/13/2017  Account Number:  [id number]  Name: Philippe Rodney    Length of psychotherapy session: 20 minutes     Total Patient Care Time Spent: 30 minutes: (Coordinated care:  counseling time with patient, individual psychotherapy with patient; discussions with family members and chart review). CC: Anxiety  SUBJECTIVE:   Philippe Rodney  is a 56 y.o.  male  patient presents for a therapy/psychopharmacological management appointment. He denies any significant problems with his mood. He notes steady increase in his anxiety throughout the day, without any clear trigger or association. No particular life stressors to report. He denies any depressive sx. No agoraphobia. One panic attack a few days ago with subsequent elevated anxiety for the last several days to a week. No specific life stressor or worry he can identify. 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 Sleeping well at night, with ambien. 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 Hearing impaired.     Appetite: good  Sleep: stable    Response to Treatment: improved  Side Effects:       Supportive/Cognitive/Reality-Oriented psychotherapy provided in regards to psychosocial stressors:   Current problems   Housing issues   Occupational issues-  some [employer] work   Academic issues   Legal issues   Medical issues   Interpersonal conflicts- continuing  to adjust to  life  Psychoeducation provided  Treatment plan reviewed with patient-including diagnosis and medications  Worked on issues of denial & effects of substance dependency/use    Visit Vitals    /87    Pulse 97    Ht 5' 7\" (1.702 m)    Wt 85.3 kg (188 lb)    BMI 29.44 kg/m2       OBJECTIVE:                 Mental Status exam: WNL except for      Sensorium  oriented to time, place and person   Relations cooperative    Eye Contact    appropriate Appearance:  age appropriate and casually dressed   Motor Behavior:  within normal limits   Speech:  normal pitch and normal volume   Thought Process: within normal limits   Thought Content not internally preoccupied    Suicidal ideations none   Homicidal ideations none   Mood:    anxious   Affect:    anxious   Memory recent  adequate   Memory remote:  adequate   Concentration:  adequate   Abstraction:  abstract   Insight:  good   Reliability good   Judgment:  good     . vitals  No Known Allergies     Current Outpatient Prescriptions   Medication Sig Dispense Refill    hyoscyamine SL (LEVSIN/SL) 0.125 mg SL tablet       clonazePAM (KLONOPIN) 0.5 mg tablet Take one tablet oral twice daily 60 Tab 3    citalopram (CELEXA) 20 mg tablet Take 1 Tab by mouth daily. In addition to 40mg tablet for a total of 60mg  Indications: major depressive disorder 30 Tab 3    citalopram (CELEXA) 40 mg tablet TAKE ONE TABLET BY MOUTH DAILY IN ADDITION TO 20MG TABLET FOR A TOTAL OF 60MG DAILY 30 Tab 3    zolpidem (AMBIEN) 10 mg tablet Take 1 Tab by mouth nightly. Max Daily Amount: 10 mg. 30 Tab 3    hydrocortisone-pramoxine (ANALPRAM HC 2.5%-1%) 2.5-1 % rectal cream       hydroCHLOROthiazide (MICROZIDE) 12.5 mg capsule Take  by mouth.  amitriptyline (ELAVIL) 10 mg tablet       hyoscyamine SR (LEVBID) 0.375 mg SR tablet TAKE ONE TABLET BY MOUTH EVERY 12 HOURS AS NEEDED FOR CRAMPING AND DIARRHEA  6    LOPERAMIDE HCL (IMODIUM PO) Take  by mouth as needed.  psyllium (METAMUCIL) 0.52 gram capsule Take 1 Cap by mouth daily.  Cholecalciferol, Vitamin D3, 3,000 unit tab Take 1 Tab by mouth daily.  meclizine (ANTIVERT) 25 mg tablet Take 25 mg by mouth daily. Medication Changes/Adjustments: There are no discontinued medications. ASSESSMENT:  Walker Epps  is a 56 y.o.  male patient presents for ongoing management of his anxiety symptoms, much improved on current medication regimen.      The following regarding medications was addressed during rounds:   -The risks and benefits of the proposed medication   -The potential medication side effects dry mouth, weight gain, GI upset, headache  -Patient given opportunity to ask questions    Diagnoses:   Axis I: Generalized Anxiety disorder; social anxiety disorder; major depressive disorder- in remission  Axis II: Deferred  Axis III: none  Axis IV: none  Axis V:61-70 mild symptoms    RECOMMENDATIONS/PLAN:   1. Medications:   Continue current regimen: Klonopin and Celexa  Continue Celexa  60mg daily- anxiety  Klonopin 0.5mg two times daily. Warned about possible sedation while driving, avoid driving if tired. Plan to taper, but will wait due to increased anxiety  Continue Ambien 10mg at bedtime   No orders of the defined types were placed in this encounter. 2.  Follow-up Disposition: Not on File     3. Continue to encourage abstinence from alcohol    4.  Provided supportive psychotherapy

## 2018-03-13 ENCOUNTER — OFFICE VISIT (OUTPATIENT)
Dept: BEHAVIORAL/MENTAL HEALTH CLINIC | Age: 48
End: 2018-03-13

## 2018-03-13 VITALS
RESPIRATION RATE: 16 BRPM | HEART RATE: 103 BPM | WEIGHT: 191.6 LBS | HEIGHT: 67 IN | DIASTOLIC BLOOD PRESSURE: 82 MMHG | TEMPERATURE: 97.4 F | SYSTOLIC BLOOD PRESSURE: 123 MMHG | OXYGEN SATURATION: 95 % | BODY MASS INDEX: 30.07 KG/M2

## 2018-03-13 DIAGNOSIS — F40.10 SOCIAL ANXIETY DISORDER: ICD-10-CM

## 2018-03-13 DIAGNOSIS — F41.1 GENERALIZED ANXIETY DISORDER: Primary | ICD-10-CM

## 2018-03-13 DIAGNOSIS — F42.8 OTHER OBSESSIVE-COMPULSIVE DISORDERS: ICD-10-CM

## 2018-03-13 DIAGNOSIS — F33.40 MAJOR DEPRESSIVE DISORDER, RECURRENT, IN REMISSION (HCC): ICD-10-CM

## 2018-03-13 DIAGNOSIS — F33.42 RECURRENT MAJOR DEPRESSIVE DISORDER, IN FULL REMISSION (HCC): ICD-10-CM

## 2018-03-13 RX ORDER — CITALOPRAM 20 MG/1
20 TABLET, FILM COATED ORAL DAILY
Qty: 30 TAB | Refills: 3 | Status: SHIPPED | OUTPATIENT
Start: 2018-03-13 | End: 2018-03-13 | Stop reason: SDUPTHER

## 2018-03-13 RX ORDER — CITALOPRAM 40 MG/1
TABLET, FILM COATED ORAL
Qty: 30 TAB | Refills: 3 | Status: SHIPPED | OUTPATIENT
Start: 2018-03-13 | End: 2018-03-13 | Stop reason: SDUPTHER

## 2018-03-13 RX ORDER — CITALOPRAM 40 MG/1
TABLET, FILM COATED ORAL
Qty: 30 TAB | Refills: 3 | Status: SHIPPED | OUTPATIENT
Start: 2018-03-13 | End: 2018-08-08 | Stop reason: SDUPTHER

## 2018-03-13 RX ORDER — AMITRIPTYLINE HYDROCHLORIDE 10 MG/1
10 TABLET, FILM COATED ORAL
COMMUNITY
End: 2018-03-13 | Stop reason: SDUPTHER

## 2018-03-13 RX ORDER — ZOLPIDEM TARTRATE 10 MG/1
10 TABLET ORAL
Qty: 30 TAB | Refills: 3 | Status: SHIPPED | OUTPATIENT
Start: 2018-03-13 | End: 2018-04-30 | Stop reason: SDUPTHER

## 2018-03-13 RX ORDER — CITALOPRAM 20 MG/1
20 TABLET, FILM COATED ORAL DAILY
Qty: 30 TAB | Refills: 3 | Status: SHIPPED | OUTPATIENT
Start: 2018-03-13 | End: 2018-08-08 | Stop reason: SDUPTHER

## 2018-03-13 RX ORDER — CLONAZEPAM 0.5 MG/1
0.5 TABLET ORAL 3 TIMES DAILY
Qty: 90 TAB | Refills: 3 | Status: SHIPPED | OUTPATIENT
Start: 2018-03-13 | End: 2018-06-18 | Stop reason: SDUPTHER

## 2018-03-13 NOTE — PROGRESS NOTES
Ines Michaels is a 56 y.o. male  Chief Complaint   Patient presents with    Mental Health Problem     PITO     1. Have you been to the ER, urgent care clinic since your last visit? Hospitalized since your last visit? No     2. Have you seen or consulted any other health care providers outside of the [address] [place] [address] since your last visit? Include any pap smears or colon screening.  Yes seen for sleep study last week    Visit Vitals    /82 (BP 1 Location: Left arm, BP Patient Position: Sitting)    Pulse (!) 103    Temp 97.4 °F (36.3 °C) (Oral)    Resp 16    Ht 5' 7\" (1.702 m)    Wt 86.9 kg (191 lb 9.6 oz)    SpO2 95%    BMI 30.01 kg/m2

## 2018-03-13 NOTE — PROGRESS NOTES
Psychiatric Progress Note    Date: 3/13/2018  Account Number:  [id number]  Name: Rosana Pozo    Length of psychotherapy session: 20 minutes     Total Patient Care Time Spent: 30 minutes: (Coordinated care:  counseling time with patient, individual psychotherapy with patient; discussions with family members and chart review). CC: Anxiety  SUBJECTIVE:   Rosana Pozo  is a 56 y.o.  male  patient presents for a therapy/psychopharmacological management appointment. He presents to the appointment reporting overall stability. He expressed concern about his weight which is the highest it has been. 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 Limited alcohol intake. Sleeping well at night, with ambien. Hearing impaired.     Appetite: good  Sleep: stable    Response to Treatment: improved  Side Effects:       Supportive/Cognitive/Reality-Oriented psychotherapy provided in regards to psychosocial stressors:   Current problems   Housing issues   Occupational issues-  some [employer] work   Academic issues   Legal issues   Medical issues   Interpersonal conflicts- continuing  to adjust to  life  Psychoeducation provided  Treatment plan reviewed with patient-including diagnosis and medications  Worked on issues of denial & effects of substance dependency/use    Visit Vitals    /82 (BP 1 Location: Left arm, BP Patient Position: Sitting)    Pulse (!) 103    Temp 97.4 °F (36.3 °C) (Oral)    Resp 16    Ht 5' 7\" (1.702 m)    Wt 86.9 kg (191 lb 9.6 oz)    SpO2 95%    BMI 30.01 kg/m2       OBJECTIVE:                 Mental Status exam: WNL except for      Sensorium  oriented to time, place and person   Relations cooperative    Eye Contact    appropriate   Appearance:  age appropriate and casually dressed   Motor Behavior:  within normal limits   Speech:  normal pitch and normal volume   Thought Process: within normal limits   Thought Content not internally preoccupied    Suicidal ideations none   Homicidal ideations none   Mood:    anxious   Affect:    anxious   Memory recent  adequate   Memory remote:  adequate   Concentration:  adequate   Abstraction:  abstract   Insight:  good   Reliability good   Judgment:  good     . vitals  No Known Allergies     Current Outpatient Prescriptions   Medication Sig Dispense Refill    citalopram (CELEXA) 20 mg tablet Take 1 Tab by mouth daily. In addition to 40mg tablet for a total of 60mg  Indications: major depressive disorder 30 Tab 3    citalopram (CELEXA) 40 mg tablet TAKE ONE TABLET BY MOUTH DAILY IN ADDITION TO 20MG TABLET FOR A TOTAL OF 60MG DAILY 30 Tab 3    zolpidem (AMBIEN) 10 mg tablet Take 1 Tab by mouth nightly. Max Daily Amount: 10 mg. 30 Tab 3    clonazePAM (KLONOPIN) 0.5 mg tablet Take 1 Tab by mouth three (3) times daily. Max Daily Amount: 1.5 mg. 90 Tab 3    hyoscyamine SL (LEVSIN/SL) 0.125 mg SL tablet       hydroCHLOROthiazide (MICROZIDE) 12.5 mg capsule Take  by mouth.  amitriptyline (ELAVIL) 10 mg tablet       LOPERAMIDE HCL (IMODIUM PO) Take  by mouth as needed.  psyllium (METAMUCIL) 0.52 gram capsule Take 1 Cap by mouth daily.  Cholecalciferol, Vitamin D3, 3,000 unit tab Take 1 Tab by mouth daily.  meclizine (ANTIVERT) 25 mg tablet Take 25 mg by mouth daily.  hydrocortisone-pramoxine (ANALPRAM HC 2.5%-1%) 2.5-1 % rectal cream       hyoscyamine SR (LEVBID) 0.375 mg SR tablet TAKE ONE TABLET BY MOUTH EVERY 12 HOURS AS NEEDED FOR CRAMPING AND DIARRHEA  6     Medication Changes/Adjustments:   Medications Discontinued During This Encounter   Medication Reason    amitriptyline (ELAVIL) 10 mg tablet Duplicate Order          ASSESSMENT:  Viola Banks  is a 56 y.o.  male patient presents for ongoing management of his anxiety symptoms, much improved on current medication regimen.      The following regarding medications was addressed during rounds:   -The risks and benefits of the proposed medication   -The potential medication side effects dry mouth, weight gain, GI upset, headache  -Patient given opportunity to ask questions    Diagnoses:   Axis I: Generalized Anxiety disorder; social anxiety disorder; major depressive disorder- in remission  Axis II: Deferred  Axis III: none  Axis IV: none  Axis V:61-70 mild symptoms    RECOMMENDATIONS/PLAN:   1. Medications:   Continue current regimen: Klonopin and Celexa  Continue Celexa  60mg daily- anxiety  Klonopin 0.5mg oral three times daily. Warned about possible sedation while driving, avoid driving if tired. Plan to taper  Continue Ambien 10mg at bedtime   Orders Placed This Encounter    DISCONTD: amitriptyline (ELAVIL) 10 mg tablet      2. Follow-up Disposition: Not on File     3. Continue to encourage abstinence from alcohol    4.  Provided supportive psychotherapy

## 2018-04-30 DIAGNOSIS — F40.10 SOCIAL ANXIETY DISORDER: ICD-10-CM

## 2018-05-01 RX ORDER — ZOLPIDEM TARTRATE 10 MG/1
10 TABLET ORAL
Qty: 30 TAB | Refills: 3 | Status: SHIPPED | OUTPATIENT
Start: 2018-05-01 | End: 2018-08-08 | Stop reason: SDUPTHER

## 2018-06-18 DIAGNOSIS — F41.1 GENERALIZED ANXIETY DISORDER: ICD-10-CM

## 2018-06-18 DIAGNOSIS — F40.10 SOCIAL ANXIETY DISORDER: ICD-10-CM

## 2018-06-18 RX ORDER — CLONAZEPAM 0.5 MG/1
0.5 TABLET ORAL 3 TIMES DAILY
Qty: 90 TAB | Refills: 2 | Status: SHIPPED | OUTPATIENT
Start: 2018-06-18 | End: 2018-08-08 | Stop reason: SDUPTHER

## 2018-06-20 DIAGNOSIS — F40.10 SOCIAL ANXIETY DISORDER: ICD-10-CM

## 2018-06-20 DIAGNOSIS — F41.1 GENERALIZED ANXIETY DISORDER: ICD-10-CM

## 2018-08-08 ENCOUNTER — OFFICE VISIT (OUTPATIENT)
Dept: BEHAVIORAL/MENTAL HEALTH CLINIC | Age: 48
End: 2018-08-08

## 2018-08-08 VITALS
HEIGHT: 67 IN | DIASTOLIC BLOOD PRESSURE: 76 MMHG | SYSTOLIC BLOOD PRESSURE: 120 MMHG | BODY MASS INDEX: 29.51 KG/M2 | HEART RATE: 100 BPM | WEIGHT: 188 LBS

## 2018-08-08 DIAGNOSIS — F33.40 MAJOR DEPRESSIVE DISORDER, RECURRENT, IN REMISSION (HCC): ICD-10-CM

## 2018-08-08 DIAGNOSIS — F41.1 GENERALIZED ANXIETY DISORDER: ICD-10-CM

## 2018-08-08 DIAGNOSIS — F40.10 SOCIAL ANXIETY DISORDER: ICD-10-CM

## 2018-08-08 DIAGNOSIS — F33.42 RECURRENT MAJOR DEPRESSIVE DISORDER, IN FULL REMISSION (HCC): Primary | ICD-10-CM

## 2018-08-08 RX ORDER — CLONAZEPAM 0.5 MG/1
0.5 TABLET ORAL 3 TIMES DAILY
Qty: 90 TAB | Refills: 3
Start: 2018-08-08 | End: 2018-12-05 | Stop reason: SDUPTHER

## 2018-08-08 RX ORDER — CITALOPRAM 40 MG/1
TABLET, FILM COATED ORAL
Qty: 30 TAB | Refills: 3
Start: 2018-08-08 | End: 2018-12-05 | Stop reason: SDUPTHER

## 2018-08-08 RX ORDER — CITALOPRAM 20 MG/1
20 TABLET, FILM COATED ORAL DAILY
Qty: 30 TAB | Refills: 3
Start: 2018-08-08 | End: 2018-12-05 | Stop reason: SDUPTHER

## 2018-08-08 RX ORDER — ZOLPIDEM TARTRATE 10 MG/1
10 TABLET ORAL
Qty: 30 TAB | Refills: 3
Start: 2018-08-08 | End: 2018-12-05 | Stop reason: SDUPTHER

## 2018-08-08 NOTE — PROGRESS NOTES
Psychiatric Progress Note    Date: 8/8/2018  Account Number:  [id number]  Name: Curly Wang    Length of psychotherapy session: 20 minutes     Total Patient Care Time Spent: 30 minutes: (Coordinated care:  counseling time with patient, individual psychotherapy with patient; discussions with family members and chart review). CC: Anxiety  SUBJECTIVE:   Curly Wang  is a 56 y.o.  male  patient presents for a therapy/psychopharmacological management appointment. Patient reports that his anxiety overall has been stable and he denies feeling depressed. His primary issue today is related to recent marital discord, which he describes as a \"rough patch\". He cites his wife's increased late work schedule as a stressor. He gave examples of them both- not fighting fairly, calling each other names, etc. He is hopeful that they will recover but he is open to possibly seeing a marital therapist in the future. He is enjoying his job and finds it to be a safe harbor compared to home. Sleeping well at night, with ambien. Hearing impaired.     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   Academic issues   Legal issues   Medical issues   Interpersonal conflicts- continuing  to adjust to  life  Psychoeducation provided  Treatment plan reviewed with patient-including diagnosis and medications  Worked on issues of denial & effects of substance dependency/use    Visit Vitals    /76    Pulse 100    Ht 5' 7\" (1.702 m)    Wt 85.3 kg (188 lb)    BMI 29.44 kg/m2       OBJECTIVE:                 Mental Status exam: WNL except for      Sensorium  oriented to time, place and person   Relations cooperative    Eye Contact    appropriate   Appearance:  age appropriate and casually dressed Motor Behavior:  within normal limits   Speech:  normal pitch and normal volume   Thought Process: within normal limits   Thought Content not internally preoccupied    Suicidal ideations none   Homicidal ideations none   Mood:    anxious   Affect:    anxious   Memory recent  adequate   Memory remote:  adequate   Concentration:  adequate   Abstraction:  abstract   Insight:  good   Reliability good   Judgment:  good     . vitals  No Known Allergies     Current Outpatient Prescriptions   Medication Sig Dispense Refill    clonazePAM (KLONOPIN) 0.5 mg tablet Take 1 Tab by mouth three (3) times daily. Max Daily Amount: 1.5 mg. 90 Tab 3    zolpidem (AMBIEN) 10 mg tablet Take 1 Tab by mouth nightly. Max Daily Amount: 10 mg. 30 Tab 3    citalopram (CELEXA) 20 mg tablet Take 1 Tab by mouth daily. In addition to 40mg tablet for a total of 60mg  Indications: major depressive disorder 30 Tab 3    citalopram (CELEXA) 40 mg tablet TAKE ONE TABLET BY MOUTH DAILY IN ADDITION TO 20MG TABLET FOR A TOTAL OF 60MG DAILY 30 Tab 3    hyoscyamine SL (LEVSIN/SL) 0.125 mg SL tablet       hydrocortisone-pramoxine (ANALPRAM HC 2.5%-1%) 2.5-1 % rectal cream       hydroCHLOROthiazide (MICROZIDE) 12.5 mg capsule Take  by mouth.  amitriptyline (ELAVIL) 10 mg tablet       hyoscyamine SR (LEVBID) 0.375 mg SR tablet TAKE ONE TABLET BY MOUTH EVERY 12 HOURS AS NEEDED FOR CRAMPING AND DIARRHEA  6    LOPERAMIDE HCL (IMODIUM PO) Take  by mouth as needed.  psyllium (METAMUCIL) 0.52 gram capsule Take 1 Cap by mouth daily.  Cholecalciferol, Vitamin D3, 3,000 unit tab Take 1 Tab by mouth daily.  meclizine (ANTIVERT) 25 mg tablet Take 25 mg by mouth daily.        Medication Changes/Adjustments:   Medications Discontinued During This Encounter   Medication Reason    clonazePAM (KLONOPIN) 0.5 mg tablet Reorder    zolpidem (AMBIEN) 10 mg tablet Reorder    citalopram (CELEXA) 20 mg tablet Reorder    citalopram (CELEXA) 40 mg tablet Reorder          ASSESSMENT:  Harpal Yi  is a 56 y.o.  male patient presents for ongoing management of his anxiety symptoms, much improved on current medication regimen. The following regarding medications was addressed during rounds:   -The risks and benefits of the proposed medication   -The potential medication side effects dry mouth, weight gain, GI upset, headache  -Patient given opportunity to ask questions    Diagnoses:   Axis I: Generalized Anxiety disorder; social anxiety disorder; major depressive disorder- in remission  Axis II: Deferred  Axis III: none  Axis IV: none  Axis V:61-70 mild symptoms    RECOMMENDATIONS/PLAN:   1. Medications:   Continue current regimen: Klonopin and Celexa  Continue Celexa  60mg daily- anxiety  Klonopin 0.5mg oral three times daily. Warned about possible sedation while driving, avoid driving if tired. Plan to taper  Continue Ambien 10mg at bedtime   Orders Placed This Encounter    clonazePAM (KLONOPIN) 0.5 mg tablet    zolpidem (AMBIEN) 10 mg tablet    citalopram (CELEXA) 20 mg tablet    citalopram (CELEXA) 40 mg tablet      2. Follow-up Disposition: Not on File     3. Continue to encourage abstinence from alcohol    4.  Provided supportive psychotherapy

## 2018-12-05 ENCOUNTER — OFFICE VISIT (OUTPATIENT)
Dept: BEHAVIORAL/MENTAL HEALTH CLINIC | Age: 48
End: 2018-12-05

## 2018-12-05 VITALS
TEMPERATURE: 97.9 F | HEART RATE: 99 BPM | RESPIRATION RATE: 16 BRPM | HEIGHT: 67 IN | SYSTOLIC BLOOD PRESSURE: 130 MMHG | DIASTOLIC BLOOD PRESSURE: 81 MMHG | BODY MASS INDEX: 29.82 KG/M2 | OXYGEN SATURATION: 97 % | WEIGHT: 190 LBS

## 2018-12-05 DIAGNOSIS — F41.1 GENERALIZED ANXIETY DISORDER: ICD-10-CM

## 2018-12-05 DIAGNOSIS — F33.40 MAJOR DEPRESSIVE DISORDER, RECURRENT, IN REMISSION (HCC): ICD-10-CM

## 2018-12-05 DIAGNOSIS — F40.10 SOCIAL ANXIETY DISORDER: ICD-10-CM

## 2018-12-05 RX ORDER — ZOLPIDEM TARTRATE 10 MG/1
10 TABLET ORAL
Qty: 30 TAB | Refills: 3 | Status: SHIPPED | OUTPATIENT
Start: 2018-12-05 | End: 2019-01-14 | Stop reason: SDUPTHER

## 2018-12-05 RX ORDER — CITALOPRAM 20 MG/1
20 TABLET, FILM COATED ORAL DAILY
Qty: 30 TAB | Refills: 3 | Status: SHIPPED | OUTPATIENT
Start: 2018-12-05 | End: 2019-03-22 | Stop reason: SDUPTHER

## 2018-12-05 RX ORDER — CITALOPRAM 40 MG/1
TABLET, FILM COATED ORAL
Qty: 30 TAB | Refills: 3 | Status: SHIPPED | OUTPATIENT
Start: 2018-12-05 | End: 2019-03-22 | Stop reason: SDUPTHER

## 2018-12-05 RX ORDER — CLONAZEPAM 0.5 MG/1
0.5 TABLET ORAL 3 TIMES DAILY
Qty: 90 TAB | Refills: 3 | Status: SHIPPED | OUTPATIENT
Start: 2018-12-05 | End: 2019-01-14 | Stop reason: SDUPTHER

## 2018-12-05 NOTE — PROGRESS NOTES
Ceci Rothman is a 53 y.o. male Chief Complaint Patient presents with  Anxiety 1. Have you been to the ER, urgent care clinic since your last visit? Hospitalized since your last visit? [facility] for [facility] \"bruised chest bone\" about a month ago 11/18 2. Have you seen or consulted any other health care providers outside of the [address] [place] [address] since your last visit? Include any pap smears or colon screening. No 
 
Visit Vitals /81 (BP 1 Location: Left arm, BP Patient Position: Sitting) Pulse 99 Temp 97.9 °F (36.6 °C) (Oral) Resp 16 Ht 5' 7\" (1.702 m) Wt 86.2 kg (190 lb) SpO2 97% BMI 29.76 kg/m²

## 2018-12-05 NOTE — PROGRESS NOTES
Psychiatric Progress Note Date: 12/5/2018 Account Number:  [id number] Name: Renae Ortez Length of psychotherapy session: 20 minutes Total Patient Care Time Spent: 30 minutes: (Coordinated care:  counseling time with patient, individual psychotherapy with patient; discussions with family members and chart review). CC: Anxiety SUBJECTIVE: Renae Ortez  is a 53 y.o.  male  patient presents for a therapy/psychopharmacological management appointment. Patient reports that he has been stable overall, no significant depressive sx or severe anxiety. No work stressors. Continued marital struggles- poor communication, difficulty sharing space, wife dealing with grief. (+)Financial stressors He is looking forward to the holidays. Hearing impaired. Appetite: good Sleep: stable Response to Treatment: improvedSide Effects:  
 
 
Supportive/Cognitive/Reality-Oriented psychotherapy provided in regards to psychosocial stressors: 
 Current problems Housing issues Occupational issues-  some [employer] work Academic issues Legal issues Medical issues Interpersonal conflicts- continuing  to adjust to  life Psychoeducation provided Treatment plan reviewed with patient-including diagnosis and medications Worked on issues of denial & effects of substance dependency/use Visit Vitals /81 (BP 1 Location: Left arm, BP Patient Position: Sitting) Pulse 99 Temp 97.9 °F (36.6 °C) (Oral) Resp 16 Ht 5' 7\" (1.702 m) Wt 86.2 kg (190 lb) SpO2 97% BMI 29.76 kg/m² OBJECTIVE: 
 
  
         
Mental Status exam: WNL except for Sensorium  oriented to time, place and person Relations cooperative Eye Contact  
 appropriate Appearance:  age appropriate and casually dressed Motor Behavior:  within normal limits Speech:  normal pitch and normal volume Thought Process: within normal limits Thought Content not internally preoccupied Suicidal ideations none Homicidal ideations none Mood:    anxious Affect:    anxious Memory recent  adequate Memory remote:  adequate Concentration:  adequate Abstraction:  abstract Insight:  good Reliability good Judgment:  good Thyra Heribertoanns vitals No Known Allergies Current Outpatient Medications Medication Sig Dispense Refill  clonazePAM (KLONOPIN) 0.5 mg tablet Take 1 Tab by mouth three (3) times daily. Max Daily Amount: 1.5 mg. 90 Tab 3  
 zolpidem (AMBIEN) 10 mg tablet Take 1 Tab by mouth nightly. Max Daily Amount: 10 mg. 30 Tab 3  
 citalopram (CELEXA) 20 mg tablet Take 1 Tab by mouth daily. In addition to 40mg tablet for a total of 60mg  Indications: major depressive disorder 30 Tab 3  
 citalopram (CELEXA) 40 mg tablet TAKE ONE TABLET BY MOUTH DAILY IN ADDITION TO 20MG TABLET FOR A TOTAL OF 60MG DAILY 30 Tab 3  
 hyoscyamine SL (LEVSIN/SL) 0.125 mg SL tablet  hydroCHLOROthiazide (MICROZIDE) 12.5 mg capsule Take  by mouth.  amitriptyline (ELAVIL) 10 mg tablet  hyoscyamine SR (LEVBID) 0.375 mg SR tablet TAKE ONE TABLET BY MOUTH EVERY 12 HOURS AS NEEDED FOR CRAMPING AND DIARRHEA  6  
 LOPERAMIDE HCL (IMODIUM PO) Take  by mouth as needed.  psyllium (METAMUCIL) 0.52 gram capsule Take 1 Cap by mouth daily.  Cholecalciferol, Vitamin D3, 3,000 unit tab Take 1 Tab by mouth daily.  meclizine (ANTIVERT) 25 mg tablet Take 25 mg by mouth daily.  hydrocortisone-pramoxine (ANALPRAM HC 2.5%-1%) 2.5-1 % rectal cream     
 
Medication Changes/Adjustments: There are no discontinued medications. ASSESSMENT: 
Leda Fernandez  is a 53 y.o.  male patient presents for ongoing management of his anxiety symptoms, much improved on current medication regimen. The following regarding medications was addressed during rounds:  
-The risks and benefits of the proposed medication  
-The potential medication side effects dry mouth, weight gain, GI upset, headache -Patient given opportunity to ask questions Diagnoses: Axis I: Generalized Anxiety disorder; social anxiety disorder; major depressive disorder- in remission Axis II: Deferred Axis III: none Axis IV: none Axis V:61-70 mild symptoms RECOMMENDATIONS/PLAN:  
1. Medications:  
Continue current regimen: Klonopin and Celexa Continue Celexa  60mg daily- anxiety Klonopin 0.5mg oral three times daily. Warned about possible sedation while driving, avoid driving if tired. Plan to taper Continue Ambien 10mg at bedtime No orders of the defined types were placed in this encounter. 2.  Follow-up Disposition: Not on File 3. Continue to encourage abstinence from alcohol 4. Provided supportive psychotherapy

## 2019-01-14 DIAGNOSIS — F41.1 GENERALIZED ANXIETY DISORDER: ICD-10-CM

## 2019-01-14 DIAGNOSIS — F40.10 SOCIAL ANXIETY DISORDER: ICD-10-CM

## 2019-01-14 RX ORDER — ZOLPIDEM TARTRATE 10 MG/1
10 TABLET ORAL
Qty: 30 TAB | Refills: 2 | OUTPATIENT
Start: 2019-01-14 | End: 2019-04-15 | Stop reason: SDUPTHER

## 2019-01-14 RX ORDER — CLONAZEPAM 0.5 MG/1
0.5 TABLET ORAL 3 TIMES DAILY
Qty: 90 TAB | Refills: 2 | OUTPATIENT
Start: 2019-01-14 | End: 2019-03-22 | Stop reason: SDUPTHER

## 2019-02-15 ENCOUNTER — OFFICE VISIT (OUTPATIENT)
Dept: BEHAVIORAL/MENTAL HEALTH CLINIC | Age: 49
End: 2019-02-15

## 2019-02-15 VITALS
WEIGHT: 192 LBS | HEART RATE: 95 BPM | HEIGHT: 67 IN | DIASTOLIC BLOOD PRESSURE: 69 MMHG | SYSTOLIC BLOOD PRESSURE: 116 MMHG | BODY MASS INDEX: 30.13 KG/M2

## 2019-02-15 DIAGNOSIS — F41.1 GENERALIZED ANXIETY DISORDER: Primary | ICD-10-CM

## 2019-02-15 DIAGNOSIS — F33.42 RECURRENT MAJOR DEPRESSIVE DISORDER, IN FULL REMISSION (HCC): ICD-10-CM

## 2019-02-15 RX ORDER — BUSPIRONE HYDROCHLORIDE 10 MG/1
10 TABLET ORAL 3 TIMES DAILY
Qty: 90 TAB | Refills: 1 | Status: SHIPPED | OUTPATIENT
Start: 2019-02-15 | End: 2019-05-14 | Stop reason: SDUPTHER

## 2019-02-15 NOTE — PATIENT INSTRUCTIONS
Please start the Buspirone at 1/2 tablet three times per day, if you have no side effects, then take the whole tablet three times day as it is written on the bottle.
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
## 2019-03-22 ENCOUNTER — OFFICE VISIT (OUTPATIENT)
Dept: BEHAVIORAL/MENTAL HEALTH CLINIC | Age: 49
End: 2019-03-22

## 2019-03-22 VITALS
DIASTOLIC BLOOD PRESSURE: 79 MMHG | BODY MASS INDEX: 30.45 KG/M2 | HEART RATE: 105 BPM | HEIGHT: 67 IN | SYSTOLIC BLOOD PRESSURE: 130 MMHG | WEIGHT: 194 LBS

## 2019-03-22 DIAGNOSIS — Z91.89 COMPLIANCE WITH MEDICATION REGIMEN: ICD-10-CM

## 2019-03-22 DIAGNOSIS — F41.1 GENERALIZED ANXIETY DISORDER: Primary | ICD-10-CM

## 2019-03-22 DIAGNOSIS — F40.10 SOCIAL ANXIETY DISORDER: ICD-10-CM

## 2019-03-22 DIAGNOSIS — F33.42 RECURRENT MAJOR DEPRESSIVE DISORDER, IN FULL REMISSION (HCC): ICD-10-CM

## 2019-03-22 DIAGNOSIS — F33.40 MAJOR DEPRESSIVE DISORDER, RECURRENT, IN REMISSION (HCC): ICD-10-CM

## 2019-03-22 RX ORDER — CITALOPRAM 40 MG/1
TABLET, FILM COATED ORAL
Qty: 30 TAB | Refills: 3 | Status: SHIPPED | OUTPATIENT
Start: 2019-03-22 | End: 2019-05-22 | Stop reason: SDUPTHER

## 2019-03-22 RX ORDER — CITALOPRAM 20 MG/1
20 TABLET, FILM COATED ORAL DAILY
Qty: 30 TAB | Refills: 3 | Status: SHIPPED | OUTPATIENT
Start: 2019-03-22 | End: 2019-05-22 | Stop reason: SDUPTHER

## 2019-03-22 RX ORDER — CLONAZEPAM 0.5 MG/1
0.5 TABLET ORAL 3 TIMES DAILY
Qty: 90 TAB | Refills: 2 | Status: SHIPPED | OUTPATIENT
Start: 2019-03-22 | End: 2019-04-15 | Stop reason: SDUPTHER
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
## 2019-03-27 LAB
ALPRAZ UR QL: NEGATIVE
AMPHETAMINES UR QL SCN: NEGATIVE NG/ML
BARBITURATES UR QL SCN: NEGATIVE NG/ML
BENZODIAZ UR QL: POSITIVE NG/ML
BZE UR QL SCN: NEGATIVE NG/ML
CANNABINOIDS UR QL SCN: NEGATIVE NG/ML
CLONAZEPAM UR CFM-MCNC: 140 NG/ML
CLONAZEPAM UR QL: POSITIVE
CREAT UR-MCNC: 58.2 MG/DL (ref 20–300)
FLURAZEPAM UR QL: NEGATIVE
LORAZEPAM UR QL: NEGATIVE
METHADONE UR QL SCN: NEGATIVE NG/ML
MIDAZOLAM UR QL CFM: NEGATIVE
NORDIAZEPAM UR QL: NEGATIVE
OPIATES UR QL SCN: NEGATIVE NG/ML
OXAZEPAM UR QL: NEGATIVE
OXYCODONE+OXYMORPHONE UR QL SCN: NEGATIVE NG/ML
PCP UR QL: NEGATIVE NG/ML
PH UR: 5.5 [PH] (ref 4.5–8.9)
PLEASE NOTE:, 733157: ABNORMAL
PROPOXYPH UR QL SCN: NEGATIVE NG/ML
SP GR UR: 1
TEMAZEPAM UR QL CFM: NEGATIVE
TRIAZOLAM UR QL: NEGATIVE

## 2019-04-15 DIAGNOSIS — F41.1 GENERALIZED ANXIETY DISORDER: ICD-10-CM

## 2019-04-15 DIAGNOSIS — F40.10 SOCIAL ANXIETY DISORDER: ICD-10-CM

## 2019-04-15 RX ORDER — CLONAZEPAM 0.5 MG/1
0.5 TABLET ORAL 3 TIMES DAILY
Qty: 90 TAB | Refills: 1 | Status: SHIPPED | OUTPATIENT
Start: 2019-04-15 | End: 2019-05-22 | Stop reason: SDUPTHER

## 2019-04-15 RX ORDER — ZOLPIDEM TARTRATE 10 MG/1
10 TABLET ORAL
Qty: 30 TAB | Refills: 1 | Status: SHIPPED | OUTPATIENT
Start: 2019-04-15 | End: 2019-05-22 | Stop reason: SDUPTHER

## 2019-05-14 DIAGNOSIS — F41.1 GENERALIZED ANXIETY DISORDER: ICD-10-CM

## 2019-05-14 DIAGNOSIS — F33.42 RECURRENT MAJOR DEPRESSIVE DISORDER, IN FULL REMISSION (HCC): ICD-10-CM

## 2019-05-15 RX ORDER — BUSPIRONE HYDROCHLORIDE 10 MG/1
TABLET ORAL
Qty: 90 TAB | Refills: 1 | Status: SHIPPED | OUTPATIENT
Start: 2019-05-15 | End: 2019-05-22 | Stop reason: SINTOL

## 2019-05-22 ENCOUNTER — OFFICE VISIT (OUTPATIENT)
Dept: BEHAVIORAL/MENTAL HEALTH CLINIC | Age: 49
End: 2019-05-22

## 2019-05-22 VITALS
DIASTOLIC BLOOD PRESSURE: 85 MMHG | WEIGHT: 192 LBS | BODY MASS INDEX: 30.13 KG/M2 | HEIGHT: 67 IN | HEART RATE: 103 BPM | SYSTOLIC BLOOD PRESSURE: 123 MMHG

## 2019-05-22 DIAGNOSIS — F42.8 OTHER OBSESSIVE-COMPULSIVE DISORDERS: ICD-10-CM

## 2019-05-22 DIAGNOSIS — F33.42 RECURRENT MAJOR DEPRESSIVE DISORDER, IN FULL REMISSION (HCC): ICD-10-CM

## 2019-05-22 DIAGNOSIS — F41.1 GENERALIZED ANXIETY DISORDER: Primary | ICD-10-CM

## 2019-05-22 DIAGNOSIS — F33.40 MAJOR DEPRESSIVE DISORDER, RECURRENT, IN REMISSION (HCC): ICD-10-CM

## 2019-05-22 DIAGNOSIS — F40.10 SOCIAL ANXIETY DISORDER: ICD-10-CM

## 2019-05-22 RX ORDER — CITALOPRAM 20 MG/1
20 TABLET, FILM COATED ORAL DAILY
Qty: 30 TAB | Refills: 3 | Status: SHIPPED | OUTPATIENT
Start: 2019-05-22 | End: 2019-08-13 | Stop reason: SDUPTHER

## 2019-05-22 RX ORDER — ZOLPIDEM TARTRATE 10 MG/1
10 TABLET ORAL
Qty: 30 TAB | Refills: 1 | Status: SHIPPED | OUTPATIENT
Start: 2019-05-22 | End: 2019-07-02 | Stop reason: SDUPTHER

## 2019-05-22 RX ORDER — CLONAZEPAM 0.5 MG/1
0.5 TABLET ORAL 3 TIMES DAILY
Qty: 90 TAB | Refills: 1 | Status: SHIPPED | OUTPATIENT
Start: 2019-05-22 | End: 2019-07-02 | Stop reason: SDUPTHER

## 2019-05-22 RX ORDER — LAMOTRIGINE 25 MG/1
TABLET ORAL
Qty: 145 TAB | Refills: 0 | Status: SHIPPED | OUTPATIENT
Start: 2019-05-22 | End: 2019-07-02 | Stop reason: SINTOL

## 2019-05-22 RX ORDER — CITALOPRAM 40 MG/1
TABLET, FILM COATED ORAL
Qty: 30 TAB | Refills: 3 | Status: SHIPPED | OUTPATIENT
Start: 2019-05-22 | End: 2019-08-13 | Stop reason: SDUPTHER
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
## 2019-07-02 ENCOUNTER — OFFICE VISIT (OUTPATIENT)
Dept: BEHAVIORAL/MENTAL HEALTH CLINIC | Age: 49
End: 2019-07-02

## 2019-07-02 VITALS
SYSTOLIC BLOOD PRESSURE: 136 MMHG | HEART RATE: 102 BPM | HEIGHT: 67 IN | DIASTOLIC BLOOD PRESSURE: 83 MMHG | WEIGHT: 191 LBS | BODY MASS INDEX: 29.98 KG/M2

## 2019-07-02 DIAGNOSIS — F40.10 SOCIAL ANXIETY DISORDER: ICD-10-CM

## 2019-07-02 DIAGNOSIS — F19.951 DRUG INDUCED HALLUCINATIONS (HCC): ICD-10-CM

## 2019-07-02 DIAGNOSIS — F42.8 OTHER OBSESSIVE-COMPULSIVE DISORDERS: ICD-10-CM

## 2019-07-02 DIAGNOSIS — F33.42 RECURRENT MAJOR DEPRESSIVE DISORDER, IN FULL REMISSION (HCC): ICD-10-CM

## 2019-07-02 DIAGNOSIS — F41.1 GENERALIZED ANXIETY DISORDER: Primary | ICD-10-CM

## 2019-07-02 DIAGNOSIS — F10.930 ALCOHOL WITHDRAWAL SYNDROME WITHOUT COMPLICATION (HCC): ICD-10-CM

## 2019-07-02 RX ORDER — ZOLPIDEM TARTRATE 10 MG/1
10 TABLET ORAL
Qty: 30 TAB | Refills: 1 | Status: SHIPPED | OUTPATIENT
Start: 2019-07-02

## 2019-07-02 RX ORDER — CLONAZEPAM 0.5 MG/1
0.5 TABLET ORAL 3 TIMES DAILY
Qty: 90 TAB | Refills: 1 | Status: SHIPPED | OUTPATIENT
Start: 2019-07-02 | End: 2019-09-24

## 2019-07-02 RX ORDER — HALOPERIDOL 0.5 MG/1
0.5 TABLET ORAL 2 TIMES DAILY
Qty: 30 TAB | Refills: 0 | Status: SHIPPED | OUTPATIENT
Start: 2019-07-02 | End: 2019-07-14 | Stop reason: SDUPTHER
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
## 2019-07-03 ENCOUNTER — TELEPHONE (OUTPATIENT)
Dept: BEHAVIORAL/MENTAL HEALTH CLINIC | Age: 49
End: 2019-07-03

## 2019-07-03 RX ORDER — GABAPENTIN 100 MG/1
100 CAPSULE ORAL 4 TIMES DAILY
Qty: 20 CAP | Refills: 0 | Status: SHIPPED | OUTPATIENT
Start: 2019-07-03 | End: 2019-07-16 | Stop reason: SDUPTHER

## 2019-07-03 NOTE — TELEPHONE ENCOUNTER
Mabel Zhagn from [facility] called regarding the pt, she states they are not able to write detox medication for the pt and pt will have to detox first before they can help him. She said once he does that then he will be a good candidate for the program. [phone].

## 2019-07-14 DIAGNOSIS — F19.951 DRUG INDUCED HALLUCINATIONS (HCC): ICD-10-CM

## 2019-07-16 DIAGNOSIS — F10.930 ALCOHOL WITHDRAWAL SYNDROME WITHOUT COMPLICATION (HCC): ICD-10-CM

## 2019-07-16 RX ORDER — HALOPERIDOL 0.5 MG/1
TABLET ORAL
Qty: 30 TAB | Refills: 0 | Status: SHIPPED | OUTPATIENT
Start: 2019-07-16 | End: 2019-07-27 | Stop reason: SDUPTHER

## 2019-07-16 RX ORDER — GABAPENTIN 100 MG/1
100 CAPSULE ORAL 4 TIMES DAILY
Qty: 20 CAP | Refills: 0 | Status: SHIPPED | OUTPATIENT
Start: 2019-07-16 | End: 2019-07-30 | Stop reason: SDUPTHER

## 2019-07-26 ENCOUNTER — TELEPHONE (OUTPATIENT)
Dept: BEHAVIORAL/MENTAL HEALTH CLINIC | Age: 49
End: 2019-07-26

## 2019-07-26 NOTE — TELEPHONE ENCOUNTER
Wife called and wanted you to know that Severa Dura is still drinking and he has no plans to quit.       She said you can call her at [phone]

## 2019-07-27 DIAGNOSIS — F19.951 DRUG INDUCED HALLUCINATIONS (HCC): ICD-10-CM

## 2019-07-29 RX ORDER — HALOPERIDOL 0.5 MG/1
TABLET ORAL
Qty: 30 TAB | Refills: 0 | Status: SHIPPED | OUTPATIENT
Start: 2019-07-29 | End: 2019-08-09 | Stop reason: SDUPTHER

## 2019-07-30 DIAGNOSIS — F10.930 ALCOHOL WITHDRAWAL SYNDROME WITHOUT COMPLICATION (HCC): ICD-10-CM

## 2019-07-30 RX ORDER — GABAPENTIN 100 MG/1
100 CAPSULE ORAL 4 TIMES DAILY
Qty: 20 CAP | Refills: 0 | Status: SHIPPED | OUTPATIENT
Start: 2019-07-30 | End: 2019-08-13 | Stop reason: DRUGHIGH

## 2019-08-09 DIAGNOSIS — F19.951 DRUG INDUCED HALLUCINATIONS (HCC): ICD-10-CM

## 2019-08-09 RX ORDER — HALOPERIDOL 0.5 MG/1
TABLET ORAL
Qty: 30 TAB | Refills: 0 | Status: SHIPPED | OUTPATIENT
Start: 2019-08-09 | End: 2019-08-13 | Stop reason: ALTCHOICE

## 2019-08-13 ENCOUNTER — OFFICE VISIT (OUTPATIENT)
Dept: BEHAVIORAL/MENTAL HEALTH CLINIC | Age: 49
End: 2019-08-13

## 2019-08-13 VITALS
HEIGHT: 67 IN | WEIGHT: 193 LBS | HEART RATE: 95 BPM | BODY MASS INDEX: 30.29 KG/M2 | DIASTOLIC BLOOD PRESSURE: 71 MMHG | SYSTOLIC BLOOD PRESSURE: 115 MMHG

## 2019-08-13 DIAGNOSIS — F33.40 MAJOR DEPRESSIVE DISORDER, RECURRENT, IN REMISSION (HCC): ICD-10-CM

## 2019-08-13 DIAGNOSIS — F33.42 RECURRENT MAJOR DEPRESSIVE DISORDER, IN FULL REMISSION (HCC): ICD-10-CM

## 2019-08-13 DIAGNOSIS — F10.10 ALCOHOL ABUSE: ICD-10-CM

## 2019-08-13 DIAGNOSIS — F40.10 SOCIAL ANXIETY DISORDER: ICD-10-CM

## 2019-08-13 DIAGNOSIS — F41.1 GENERALIZED ANXIETY DISORDER: Primary | ICD-10-CM

## 2019-08-13 RX ORDER — CITALOPRAM 20 MG/1
20 TABLET, FILM COATED ORAL DAILY
Qty: 30 TAB | Refills: 3 | Status: SHIPPED | OUTPATIENT
Start: 2019-08-13 | End: 2019-11-22 | Stop reason: SDUPTHER

## 2019-08-13 RX ORDER — CITALOPRAM 40 MG/1
TABLET, FILM COATED ORAL
Qty: 30 TAB | Refills: 3 | Status: SHIPPED | OUTPATIENT
Start: 2019-08-13 | End: 2019-11-22 | Stop reason: SDUPTHER

## 2019-08-13 RX ORDER — GABAPENTIN 300 MG/1
300 CAPSULE ORAL 3 TIMES DAILY
Qty: 90 CAP | Refills: 1 | Status: SHIPPED | OUTPATIENT
Start: 2019-08-13 | End: 2019-09-24 | Stop reason: SDUPTHER
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
## 2019-09-24 ENCOUNTER — OFFICE VISIT (OUTPATIENT)
Dept: BEHAVIORAL/MENTAL HEALTH CLINIC | Age: 49
End: 2019-09-24

## 2019-09-24 VITALS
SYSTOLIC BLOOD PRESSURE: 123 MMHG | DIASTOLIC BLOOD PRESSURE: 80 MMHG | BODY MASS INDEX: 31.08 KG/M2 | HEART RATE: 102 BPM | HEIGHT: 67 IN | WEIGHT: 198 LBS

## 2019-09-24 DIAGNOSIS — F40.10 SOCIAL ANXIETY DISORDER: ICD-10-CM

## 2019-09-24 DIAGNOSIS — F10.10 ALCOHOL ABUSE: ICD-10-CM

## 2019-09-24 DIAGNOSIS — F41.1 GENERALIZED ANXIETY DISORDER: Primary | ICD-10-CM

## 2019-09-24 DIAGNOSIS — F42.8 OTHER OBSESSIVE-COMPULSIVE DISORDERS: ICD-10-CM

## 2019-09-24 RX ORDER — GABAPENTIN 300 MG/1
300 CAPSULE ORAL 3 TIMES DAILY
Qty: 90 CAP | Refills: 1 | Status: SHIPPED | OUTPATIENT
Start: 2019-09-24 | End: 2019-11-22 | Stop reason: SDUPTHER
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
## 2019-09-24 NOTE — PATIENT INSTRUCTIONS
Gradual tapering off of Klonopin as directed: Take 3 and 1/2 tablets daily for 10 days    Then take 3 tablets daily for 10 days      Then take 2 &1/2 tablets  Daily for 10 days    Then take 2 tablets daily for 10 days    Then take 1 &1/2  Tablets daily for 10 days      Then take 1 tablet daily for 10 days,    Then take 1/2 tablet daily for 10 days and stop/Discontinue. PLEASE SEEK ONE OF THE ALCOHOL REHABILITATION PROGRAMS THAT WERE PROVIDED TO YOU AND ATTEND AA MEETINGS.

## 2019-11-22 ENCOUNTER — OFFICE VISIT (OUTPATIENT)
Dept: BEHAVIORAL/MENTAL HEALTH CLINIC | Age: 49
End: 2019-11-22

## 2019-11-22 VITALS
HEART RATE: 91 BPM | HEIGHT: 67 IN | BODY MASS INDEX: 31.55 KG/M2 | WEIGHT: 201 LBS | SYSTOLIC BLOOD PRESSURE: 137 MMHG | DIASTOLIC BLOOD PRESSURE: 74 MMHG | OXYGEN SATURATION: 95 %

## 2019-11-22 DIAGNOSIS — F10.10 ALCOHOL ABUSE: Primary | ICD-10-CM

## 2019-11-22 DIAGNOSIS — F40.10 SOCIAL ANXIETY DISORDER: ICD-10-CM

## 2019-11-22 DIAGNOSIS — F33.42 RECURRENT MAJOR DEPRESSIVE DISORDER, IN FULL REMISSION (HCC): ICD-10-CM

## 2019-11-22 DIAGNOSIS — F33.40 MAJOR DEPRESSIVE DISORDER, RECURRENT, IN REMISSION (HCC): ICD-10-CM

## 2019-11-22 DIAGNOSIS — F41.1 GENERALIZED ANXIETY DISORDER: ICD-10-CM

## 2019-11-22 RX ORDER — CITALOPRAM 20 MG/1
20 TABLET, FILM COATED ORAL DAILY
Qty: 30 TAB | Refills: 3 | Status: SHIPPED | OUTPATIENT
Start: 2019-11-22 | End: 2020-04-08

## 2019-11-22 RX ORDER — GABAPENTIN 300 MG/1
300 CAPSULE ORAL 3 TIMES DAILY
Qty: 90 CAP | Refills: 1 | Status: SHIPPED | OUTPATIENT
Start: 2019-11-22

## 2019-11-22 RX ORDER — CITALOPRAM 40 MG/1
TABLET, FILM COATED ORAL
Qty: 30 TAB | Refills: 3 | Status: SHIPPED | OUTPATIENT
Start: 2019-11-22 | End: 2020-04-08
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
## 2019-11-22 NOTE — PROGRESS NOTES
Ana Thomas is a 51 y.o. male    Chief Complaint   Patient presents with    Follow-up       Blood pressure 137/74, pulse 91, height 5' 7\" (1.702 m), weight 201 lb (91.2 kg), SpO2 95 %. 1. Have you been to the ER, urgent care clinic since your last visit? Hospitalized since your last visit? No      2. Have you seen or consulted any other health care providers outside of the [address] [place] [address] since your last visit? Include any pap smears or colon screening.   No

## 2019-12-03 RX ORDER — CLONAZEPAM 0.5 MG/1
TABLET ORAL
Qty: 90 TAB | Refills: 1 | OUTPATIENT
Start: 2019-12-03

## 2019-12-06 ENCOUNTER — TELEPHONE (OUTPATIENT)
Dept: BEHAVIORAL/MENTAL HEALTH CLINIC | Age: 49
End: 2019-12-06

## 2019-12-06 NOTE — TELEPHONE ENCOUNTER
Pt left vm asking for RF on klonopin. Said provider temporarily took him off of it and he's allowed to be on it now.     [phone]

## 2019-12-06 NOTE — TELEPHONE ENCOUNTER
I called him just now and left him a long message about his request. That we will not be able to honor his request based on his current diagnoses and situation.

## 2020-04-08 DIAGNOSIS — F41.1 GENERALIZED ANXIETY DISORDER: ICD-10-CM

## 2020-04-08 DIAGNOSIS — F40.10 SOCIAL ANXIETY DISORDER: ICD-10-CM

## 2020-04-08 DIAGNOSIS — F33.40 MAJOR DEPRESSIVE DISORDER, RECURRENT, IN REMISSION (HCC): ICD-10-CM

## 2020-04-08 RX ORDER — CITALOPRAM 20 MG/1
TABLET, FILM COATED ORAL
Qty: 30 TAB | Refills: 3 | Status: SHIPPED | OUTPATIENT
Start: 2020-04-08

## 2020-04-08 RX ORDER — CITALOPRAM 40 MG/1
TABLET, FILM COATED ORAL
Qty: 30 TAB | Refills: 3 | Status: SHIPPED | OUTPATIENT
Start: 2020-04-08

## 2022-03-18 PROBLEM — F10.10 ALCOHOL ABUSE: Status: ACTIVE | Noted: 2019-08-13

## 2022-06-06 ENCOUNTER — TELEPHONE (OUTPATIENT)
Dept: UROLOGY | Age: 52
End: 2022-06-06

## 2022-06-06 NOTE — TELEPHONE ENCOUNTER
Patient called to schedule a new pt appt for a 2mm kidney stone and 4 mm kidney stone . He had a CT done at [address] [place] [address] on last wed and thursday. He currently see's [facility] and is having a procedure for it on 6/13.  He wanted to know if we had any appts before then and I let him know he couldn't see two urologist. He said he will call back if he doesn't have his procedure

## 2023-01-19 ENCOUNTER — HOSPITAL ENCOUNTER (EMERGENCY)
Age: 53
Discharge: HOME | End: 2023-01-19
Attending: EMERGENCY MEDICINE
Payer: COMMERCIAL

## 2023-01-19 ENCOUNTER — APPOINTMENT (OUTPATIENT)
Dept: CT IMAGING | Age: 53
End: 2023-01-19
Attending: PHYSICIAN ASSISTANT
Payer: COMMERCIAL

## 2023-01-19 VITALS
WEIGHT: 186.29 LBS | HEIGHT: 68 IN | RESPIRATION RATE: 20 BRPM | HEART RATE: 90 BPM | SYSTOLIC BLOOD PRESSURE: 130 MMHG | TEMPERATURE: 98 F | OXYGEN SATURATION: 94 % | DIASTOLIC BLOOD PRESSURE: 87 MMHG | BODY MASS INDEX: 28.23 KG/M2

## 2023-01-19 DIAGNOSIS — R19.7 DIARRHEA, UNSPECIFIED TYPE: ICD-10-CM

## 2023-01-19 DIAGNOSIS — R10.32 ABDOMINAL PAIN, LLQ (LEFT LOWER QUADRANT): Primary | ICD-10-CM

## 2023-01-19 DIAGNOSIS — R11.2 NAUSEA AND VOMITING, UNSPECIFIED VOMITING TYPE: ICD-10-CM

## 2023-01-19 LAB
ALBUMIN SERPL-MCNC: 3.8 G/DL (ref 3.5–5)
ALBUMIN/GLOB SERPL: 1.3 (ref 1.1–2.2)
ALP SERPL-CCNC: 64 U/L (ref 45–117)
ALT SERPL-CCNC: 18 U/L (ref 12–78)
ANION GAP SERPL CALC-SCNC: 10 MMOL/L (ref 5–15)
AST SERPL-CCNC: 13 U/L (ref 15–37)
BASOPHILS # BLD: 0.1 K/UL (ref 0–0.1)
BASOPHILS NFR BLD: 1 % (ref 0–1)
BILIRUB SERPL-MCNC: 0.5 MG/DL (ref 0.2–1)
BUN SERPL-MCNC: 13 MG/DL (ref 6–20)
BUN/CREAT SERPL: 12 (ref 12–20)
CALCIUM SERPL-MCNC: 9 MG/DL (ref 8.5–10.1)
CHLORIDE SERPL-SCNC: 111 MMOL/L (ref 97–108)
CO2 SERPL-SCNC: 27 MMOL/L (ref 21–32)
CREAT SERPL-MCNC: 1.13 MG/DL (ref 0.7–1.3)
DIFFERENTIAL METHOD BLD: ABNORMAL
EOSINOPHIL # BLD: 0.2 K/UL (ref 0–0.4)
EOSINOPHIL NFR BLD: 4 % (ref 0–7)
ERYTHROCYTE [DISTWIDTH] IN BLOOD BY AUTOMATED COUNT: 15.7 % (ref 11.5–14.5)
GLOBULIN SER CALC-MCNC: 3 G/DL (ref 2–4)
GLUCOSE SERPL-MCNC: 147 MG/DL (ref 65–100)
HCT VFR BLD AUTO: 37.7 % (ref 36.6–50.3)
HGB BLD-MCNC: 12.3 G/DL (ref 12.1–17)
IMM GRANULOCYTES # BLD AUTO: 0 K/UL (ref 0–0.04)
IMM GRANULOCYTES NFR BLD AUTO: 1 % (ref 0–0.5)
LYMPHOCYTES # BLD: 1.1 K/UL (ref 0.8–3.5)
LYMPHOCYTES NFR BLD: 25 % (ref 12–49)
MCH RBC QN AUTO: 26.3 PG (ref 26–34)
MCHC RBC AUTO-ENTMCNC: 32.6 G/DL (ref 30–36.5)
MCV RBC AUTO: 80.6 FL (ref 80–99)
MONOCYTES # BLD: 0.4 K/UL (ref 0–1)
MONOCYTES NFR BLD: 8 % (ref 5–13)
NEUTS SEG # BLD: 2.7 K/UL (ref 1.8–8)
NEUTS SEG NFR BLD: 61 % (ref 32–75)
NRBC # BLD: 0 K/UL (ref 0–0.01)
NRBC BLD-RTO: 0 PER 100 WBC
PLATELET # BLD AUTO: 216 K/UL (ref 150–400)
PMV BLD AUTO: 9.9 FL (ref 8.9–12.9)
POTASSIUM SERPL-SCNC: 4.2 MMOL/L (ref 3.5–5.1)
PROT SERPL-MCNC: 6.8 G/DL (ref 6.4–8.2)
RBC # BLD AUTO: 4.68 M/UL (ref 4.1–5.7)
SODIUM SERPL-SCNC: 148 MMOL/L (ref 136–145)
WBC # BLD AUTO: 4.4 K/UL (ref 4.1–11.1)

## 2023-01-19 PROCEDURE — 80053 COMPREHEN METABOLIC PANEL: CPT

## 2023-01-19 PROCEDURE — 74177 CT ABD & PELVIS W/CONTRAST: CPT

## 2023-01-19 PROCEDURE — 99285 EMERGENCY DEPT VISIT HI MDM: CPT

## 2023-01-19 PROCEDURE — 96374 THER/PROPH/DIAG INJ IV PUSH: CPT

## 2023-01-19 PROCEDURE — 74011250636 HC RX REV CODE- 250/636: Performed by: PHYSICIAN ASSISTANT

## 2023-01-19 PROCEDURE — 36415 COLL VENOUS BLD VENIPUNCTURE: CPT

## 2023-01-19 PROCEDURE — 74011000636 HC RX REV CODE- 636: Performed by: EMERGENCY MEDICINE

## 2023-01-19 PROCEDURE — 85025 COMPLETE CBC W/AUTO DIFF WBC: CPT

## 2023-01-19 RX ORDER — BUPROPION HYDROCHLORIDE 300 MG/1
TABLET ORAL
COMMUNITY
Start: 2023-01-05

## 2023-01-19 RX ORDER — ONDANSETRON 4 MG/1
4 TABLET, ORALLY DISINTEGRATING ORAL
Qty: 12 TABLET | Refills: 0 | Status: SHIPPED | OUTPATIENT
Start: 2023-01-19

## 2023-01-19 RX ORDER — DICYCLOMINE HYDROCHLORIDE 20 MG/1
20 TABLET ORAL
Qty: 20 TABLET | Refills: 0 | Status: SHIPPED | OUTPATIENT
Start: 2023-01-19

## 2023-01-19 RX ORDER — ONDANSETRON 2 MG/ML
4 INJECTION INTRAMUSCULAR; INTRAVENOUS
Status: COMPLETED | OUTPATIENT
Start: 2023-01-19 | End: 2023-01-19

## 2023-01-19 RX ORDER — ATORVASTATIN CALCIUM 20 MG/1
TABLET, FILM COATED ORAL
COMMUNITY
Start: 2023-01-14

## 2023-01-19 RX ADMIN — ONDANSETRON HYDROCHLORIDE 4 MG: 2 SOLUTION INTRAMUSCULAR; INTRAVENOUS at 18:52

## 2023-01-19 RX ADMIN — IOPAMIDOL 100 ML: 755 INJECTION, SOLUTION INTRAVENOUS at 19:47

## 2023-01-19 NOTE — ED TRIAGE NOTES
[facility] with complaint of [facility]BD, N/V/D. [facility]. [facility] at [facility]. Patient has results with him. Duration 1 week.

## 2023-01-19 NOTE — ED PROVIDER NOTES
46 y/o male presenting with complaint of abdominal pain, vomiting and diarrhea. The patient states that he has felt generally unwell for the past few days, followed by onset of abdominal pain, vomiting and diarrhea today. The pain is primarily located in the LLQ, radiating to his mid abdomen. He was seen at urgent care this afternoon and was advised to come to the ED for further evaluation. He does report a previous history of kidney stones. He denies fevers, cough, congestion, dysuria or urgency/frequency. The history is provided by the patient. Past Medical History:   Diagnosis Date    Anxiety and depression     Hypertension     Kidney stone        No past surgical history on file. Family History:   Problem Relation Age of Onset    Glaucoma Mother     Cancer Father     No Known Problems Sister        Social History     Socioeconomic History    Marital status: SINGLE     Spouse name: Not on file    Number of children: Not on file    Years of education: Not on file    Highest education level: Not on file   Occupational History    Not on file   Tobacco Use    Smoking status: Never    Smokeless tobacco: Never   Substance and Sexual Activity    Alcohol use: Yes     Comment: occassional    Drug use: Not on file    Sexual activity: Not on file   Other Topics Concern    Not on file   Social History Narrative    Not on file     Social Determinants of Health     Financial Resource Strain: Not on file   Food Insecurity: Not on file   Transportation Needs: Not on file   Physical Activity: Not on file   Stress: Not on file   Social Connections: Not on file   Intimate Partner Violence: Not on file   Housing Stability: Not on file         ALLERGIES: Patient has no known allergies. Review of Systems   Constitutional:  Negative for chills and fever. HENT:  Negative for congestion. Respiratory:  Negative for cough. Gastrointestinal:  Positive for abdominal pain, diarrhea, nausea and vomiting. Genitourinary:  Negative for dysuria, frequency and urgency. Musculoskeletal:  Negative for myalgias. Neurological:  Negative for syncope. All other systems reviewed and are negative. Vitals:    01/19/23 1718   BP: 124/79   Pulse: 90   Resp: 20   Temp: 98 °F (36.7 °C)   SpO2: 97%   Weight: 84.5 kg (186 lb 4.6 oz)   Height: 5' 8\" (1.727 m)            Physical Exam  Vitals and nursing note reviewed. Constitutional:       General: He is not in acute distress. Appearance: He is well-developed. He is not diaphoretic. HENT:      Head: Normocephalic and atraumatic. Eyes:      Extraocular Movements: Extraocular movements intact. Conjunctiva/sclera: Conjunctivae normal.   Cardiovascular:      Rate and Rhythm: Normal rate. Pulmonary:      Effort: Pulmonary effort is normal. No respiratory distress. Abdominal:      General: There is no distension. Palpations: Abdomen is soft. Tenderness: There is abdominal tenderness (mild LLQ). There is no guarding or rebound. Skin:     General: Skin is warm and dry. Neurological:      Mental Status: He is alert and oriented to person, place, and time. Medical Decision Making  Amount and/or Complexity of Data Reviewed  Labs: ordered. Radiology: ordered. Procedures        48 y/o male presenting with complaint of abdominal pain, vomiting and diarrhea. The patient is well-appearing in no acute distress, abdominal exam benign. CBC and CMP are unremarkable. CT abd/pelvis pending. DDx includes diverticulitis, viral illness, constipation, non-specific abdominal pain, vs less likely bowel obstruction. Care of this patient transferred to Dr. Jos Brewer at end of shift, please see his documentation for further ED course and disposition.

## 2023-01-20 NOTE — DISCHARGE INSTRUCTIONS
Return to the ER with any new or worsening symptoms. In the meantime please take the nausea medication and Bentyl as directed. Follow-up with your primary care doctor.

## 2023-01-20 NOTE — ED PROVIDER NOTES
61-year-old male who presented to the emergency department due to nausea vomiting and diarrhea. Care signed out to me by previous provider pending results of his CT scan. Please see previous notes for details. CT scan shows some incidentally noted renal cyst but no other acute abnormalities. He says he feels much better on reassessment. Patient has been given prescriptions for Zofran and Bentyl. He was discharged in stable condition.

## 2023-05-17 RX ORDER — CITALOPRAM 40 MG/1
TABLET ORAL
COMMUNITY
Start: 2020-04-08

## 2023-05-17 RX ORDER — BUPROPION HYDROCHLORIDE 300 MG/1
TABLET ORAL
COMMUNITY
Start: 2023-01-05

## 2023-05-17 RX ORDER — CITALOPRAM 20 MG/1
TABLET ORAL
COMMUNITY
Start: 2020-04-08

## 2023-05-17 RX ORDER — GABAPENTIN 300 MG/1
300 CAPSULE ORAL 3 TIMES DAILY
COMMUNITY
Start: 2019-11-22

## 2023-05-17 RX ORDER — MECLIZINE HYDROCHLORIDE 25 MG/1
25 TABLET ORAL DAILY
COMMUNITY

## 2023-05-17 RX ORDER — HYOSCYAMINE SULFATE EXTENDED-RELEASE 0.38 MG/1
TABLET ORAL
COMMUNITY
Start: 2016-08-19

## 2023-05-17 RX ORDER — ZOLPIDEM TARTRATE 10 MG/1
10 TABLET ORAL
COMMUNITY
Start: 2019-07-02

## 2023-05-17 RX ORDER — DICYCLOMINE HCL 20 MG
20 TABLET ORAL EVERY 6 HOURS PRN
COMMUNITY
Start: 2023-01-19

## 2023-05-17 RX ORDER — ATORVASTATIN CALCIUM 20 MG/1
TABLET, FILM COATED ORAL
COMMUNITY
Start: 2023-01-14

## 2023-05-17 RX ORDER — ONDANSETRON 4 MG/1
4 TABLET, ORALLY DISINTEGRATING ORAL EVERY 8 HOURS PRN
COMMUNITY
Start: 2023-01-19

## 2023-05-17 RX ORDER — HYOSCYAMINE SULFATE 0.12 MG/1
TABLET SUBLINGUAL
COMMUNITY
Start: 2017-07-25